# Patient Record
Sex: FEMALE | Race: WHITE | NOT HISPANIC OR LATINO | Employment: UNEMPLOYED | URBAN - NONMETROPOLITAN AREA
[De-identification: names, ages, dates, MRNs, and addresses within clinical notes are randomized per-mention and may not be internally consistent; named-entity substitution may affect disease eponyms.]

---

## 2017-06-27 ENCOUNTER — OFFICE VISIT (OUTPATIENT)
Dept: CARDIOLOGY | Facility: CLINIC | Age: 37
End: 2017-06-27

## 2017-06-27 ENCOUNTER — HOSPITAL ENCOUNTER (OUTPATIENT)
Dept: RESPIRATORY THERAPY | Facility: HOSPITAL | Age: 37
Discharge: HOME | End: 2017-06-27
Attending: INTERNAL MEDICINE | Admitting: INTERNAL MEDICINE

## 2017-06-27 VITALS
HEART RATE: 70 BPM | DIASTOLIC BLOOD PRESSURE: 96 MMHG | WEIGHT: 260.8 LBS | OXYGEN SATURATION: 96 % | BODY MASS INDEX: 46.21 KG/M2 | HEIGHT: 63 IN | SYSTOLIC BLOOD PRESSURE: 137 MMHG

## 2017-06-27 DIAGNOSIS — R00.0 TACHYCARDIA: Primary | ICD-10-CM

## 2017-06-27 DIAGNOSIS — E66.01 OBESITY, CLASS III, BMI 40-49.9 (MORBID OBESITY) (HCC): ICD-10-CM

## 2017-06-27 DIAGNOSIS — R55 NEAR SYNCOPE: ICD-10-CM

## 2017-06-27 DIAGNOSIS — R00.2 PALPITATIONS: ICD-10-CM

## 2017-06-27 DIAGNOSIS — I10 ESSENTIAL HYPERTENSION: ICD-10-CM

## 2017-06-27 PROCEDURE — 93226 XTRNL ECG REC<48 HR SCAN A/R: CPT

## 2017-06-27 PROCEDURE — 93227 XTRNL ECG REC<48 HR R&I: CPT | Performed by: INTERNAL MEDICINE

## 2017-06-27 PROCEDURE — 93000 ELECTROCARDIOGRAM COMPLETE: CPT | Performed by: INTERNAL MEDICINE

## 2017-06-27 PROCEDURE — 99244 OFF/OP CNSLTJ NEW/EST MOD 40: CPT | Performed by: INTERNAL MEDICINE

## 2017-06-27 PROCEDURE — 93225 XTRNL ECG REC<48 HRS REC: CPT

## 2017-06-27 RX ORDER — AZATHIOPRINE 50 MG/1
50 TABLET ORAL DAILY
COMMUNITY
End: 2020-09-14

## 2017-06-27 NOTE — PROGRESS NOTES
"Subjective   Starla Lane is a 37 y.o. female.     Chief Complaint   Patient presents with   • NEW PATIENT   • Rapid Heart Rate       History of Present Illness     Starla is an interesting 37-year-old woman who presents primarily for evaluation of palpitations.  She states that she has had a lifelong history of palpitations and that she has previously seen multiple different cardiologists.  He states that at one point she wore a Holter monitor and was told \"that she is having PVCs\" and essentially deal with that.  She states that she has been placed on Lopressor and that if she misses a dose or if she takes medication late she notes her symptoms are much worse.  She states that she underwent a preoperative evaluation in 2014 with a stress test and an echocardiogram both of which were normal.  This was confirmed by record review.  She states that she has frequent palpitations on a daily basis.  She notes that when her palpitations are significant she has near syncopal episodes and that on occasion her  has had to catch her.  She denies a history of congenital heart disease.  She denies history of congestive heart failure symptoms.  She denies any angina or anginal-like symptoms.  She does note that she drinks approximately 3 Mountain Dew soft drinks per day.  She does not drink any coffee.  She does not use illicit drugs.  She denies history of thyroid disease.  She denies any recent change in the pattern of her palpitations but overall feels that they have gotten worse with time.  She also notes that she has been hypertensive and believes that her blood pressures have registered as 140 mmHg over 120 mmHg.  She notes that when her blood pressure is elevated she feels flushed and has shortness of breath and chest discomfort.  She notes that she lives a very sedentary lifestyle due to her lupus and severe ankle and knee pain.    The following portions of the patient's history were reviewed and updated as " appropriate: allergies, current medications, past family history, past medical history, past social history, past surgical history and problem list.    Review of Systems   Constitutional: Positive for fatigue. Negative for activity change and appetite change.   HENT: Positive for tinnitus. Negative for congestion.    Eyes: Positive for visual disturbance.   Respiratory: Positive for shortness of breath. Negative for cough and chest tightness.    Cardiovascular: Positive for chest pain, palpitations and leg swelling.   Gastrointestinal: Negative for blood in stool, nausea and vomiting.   Endocrine: Negative for cold intolerance, heat intolerance and polyuria.   Genitourinary: Negative for dysuria.   Musculoskeletal: Positive for neck pain. Negative for myalgias.   Skin: Positive for rash.   Neurological: Positive for weakness and light-headedness. Negative for dizziness and syncope.   Hematological: Does not bruise/bleed easily.   Psychiatric/Behavioral: Negative for confusion and sleep disturbance.       Objective   Physical Exam   Constitutional: She is oriented to person, place, and time. She appears well-developed and well-nourished. No distress.   She is an obese woman in no apparent distress,   HENT:   Head: Normocephalic and atraumatic.   Nose: Nose normal.   Mouth/Throat: Oropharynx is clear and moist.   Eyes: Conjunctivae and EOM are normal. Right eye exhibits no discharge. Left eye exhibits no discharge. No scleral icterus.   Neck: Normal range of motion. No hepatojugular reflux and no JVD present. Carotid bruit is not present. No tracheal deviation present.   Cardiovascular: Normal rate, regular rhythm, S1 normal, S2 normal and intact distal pulses.  PMI is not displaced.  Exam reveals no gallop, no S3, no S4 and no friction rub.    No murmur heard.  Pulmonary/Chest: Effort normal and breath sounds normal. No accessory muscle usage. No respiratory distress. She has no wheezes. She has no rales. She  exhibits no tenderness.   Abdominal: Soft. Bowel sounds are normal. She exhibits no distension. There is no tenderness.   Musculoskeletal: Normal range of motion. She exhibits no edema or tenderness.       Vascular Status -  Her exam exhibits no right foot edema. Her exam exhibits no left foot edema.  Neurological: She is alert and oriented to person, place, and time. No cranial nerve deficit. Coordination normal.   Skin: Skin is warm and dry. She is not diaphoretic.   Nursing note and vitals reviewed.        ECG 12 Lead  Date/Time: 6/27/2017 3:42 PM  Performed by: HAL STRATTON  Authorized by: HAL STRATTON   Comments: EKG today reveals normal sinus rhythm with normal intervals and durations.  There are no acute or chronic ischemic changes noted.        Review of outside laboratory data was largely unremarkable.    Assessment/Plan   Palpitations  In regard to her history of palpitations, I suspect that she likely is having frequent premature ventricular contractions as noted above.  However, in order to confirm this I have asked her to obtain a 48 hour Holter monitor.  Additionally, I have asked her to repeat her echocardiogram in order to make sure that she indeed does not have structural heart disease but also given how frequent her premature ventricular contractions apparently are there is a possibility that she has had LV systolic dysfunction from.  I had a lengthy discussion with her noting that her overall cardiac prognosis is excellent regardless of the cause for palpitations.  Given how symptomatic she is may be reasonable to consider a mild antiarrhythmic or even perhaps ablative therapy.  We will consider our options further after the above-noted testing has been performed    HTN  Overall I am concerned that their hypertension is not well controlled.  However, her description of her blood pressures is relatively unusual with a very narrow pulse pressure.  I have asked her to bring her cuff into  the office so we can more accurately assess her blood pressure.  I will escalate her medical therapy as necessary    Obesity.  In regard to her history of obesity, I did discuss the importance of weight loss and in particular likely secondary elevation of her pulmonary measures due to restrictive lung disease as well as probable sleep apnea.  I also discussed the relationship of this with her cardiac arrhythmias and the imperative need for weight loss.  I have directed her to consider water aerobics given her arthritic complaints and have recommended weight watchers.  I've instructed her to try to obtain a BMI of 25 or less.    In short, it is been a pleasure to participate in Starla's care, I look forward to seeing her again in one month.

## 2017-07-05 ENCOUNTER — TELEPHONE (OUTPATIENT)
Dept: CARDIOLOGY | Facility: CLINIC | Age: 37
End: 2017-07-05

## 2017-07-05 NOTE — TELEPHONE ENCOUNTER
----- Message from Joni Prasad MD sent at 7/3/2017  3:48 PM EDT -----  Let her know that her Holter was normal    sdf

## 2017-07-06 ENCOUNTER — TELEPHONE (OUTPATIENT)
Dept: CARDIOLOGY | Facility: CLINIC | Age: 37
End: 2017-07-06

## 2017-07-12 ENCOUNTER — APPOINTMENT (OUTPATIENT)
Dept: CARDIOLOGY | Facility: HOSPITAL | Age: 37
End: 2017-07-12
Attending: INTERNAL MEDICINE

## 2017-07-25 ENCOUNTER — TELEPHONE (OUTPATIENT)
Dept: CARDIOLOGY | Facility: CLINIC | Age: 37
End: 2017-07-25

## 2017-07-25 NOTE — TELEPHONE ENCOUNTER
----- Message from Joni Prasad MD sent at 7/3/2017  3:48 PM EDT -----  Let her know that her Holter was normal    sdf      HAVE TRIED TO CALL THE PT OVER 5 TIMES TO MAKE HER AWARE THAT PER DR. PRASAD HER HOLTER WAS NORMAL.    WILL MAIL PT A LETTER.    [facility]

## 2020-09-14 RX ORDER — GABAPENTIN 100 MG/1
100 CAPSULE ORAL 2 TIMES DAILY
Status: ON HOLD | COMMUNITY
End: 2020-12-18

## 2020-09-14 RX ORDER — AZATHIOPRINE 50 MG/1
100 TABLET ORAL DAILY
COMMUNITY

## 2020-09-14 RX ORDER — ESCITALOPRAM OXALATE 20 MG/1
20 TABLET ORAL DAILY
Status: ON HOLD | COMMUNITY
End: 2020-12-18

## 2020-09-14 RX ORDER — HYDROCHLOROTHIAZIDE 25 MG/1
25 TABLET ORAL DAILY
COMMUNITY

## 2020-09-14 RX ORDER — VALSARTAN 160 MG/1
160 TABLET ORAL DAILY
COMMUNITY
End: 2020-10-05

## 2020-10-05 ENCOUNTER — OFFICE VISIT (OUTPATIENT)
Dept: BARIATRICS/WEIGHT MGMT | Facility: CLINIC | Age: 40
End: 2020-10-05

## 2020-10-05 VITALS
HEIGHT: 63 IN | SYSTOLIC BLOOD PRESSURE: 130 MMHG | OXYGEN SATURATION: 98 % | RESPIRATION RATE: 18 BRPM | TEMPERATURE: 96.2 F | BODY MASS INDEX: 51.91 KG/M2 | DIASTOLIC BLOOD PRESSURE: 72 MMHG | HEART RATE: 82 BPM | WEIGHT: 293 LBS

## 2020-10-05 DIAGNOSIS — I10 ESSENTIAL HYPERTENSION: ICD-10-CM

## 2020-10-05 DIAGNOSIS — M19.90 OSTEOARTHRITIS, UNSPECIFIED OSTEOARTHRITIS TYPE, UNSPECIFIED SITE: ICD-10-CM

## 2020-10-05 DIAGNOSIS — M32.9 LUPUS (HCC): ICD-10-CM

## 2020-10-05 DIAGNOSIS — R13.10 DYSPHAGIA, UNSPECIFIED TYPE: Primary | ICD-10-CM

## 2020-10-05 DIAGNOSIS — E78.5 HYPERLIPIDEMIA, UNSPECIFIED HYPERLIPIDEMIA TYPE: ICD-10-CM

## 2020-10-05 DIAGNOSIS — R11.11 VOMITING WITHOUT NAUSEA, INTRACTABILITY OF VOMITING NOT SPECIFIED, UNSPECIFIED VOMITING TYPE: ICD-10-CM

## 2020-10-05 DIAGNOSIS — K21.9 GASTROESOPHAGEAL REFLUX DISEASE, UNSPECIFIED WHETHER ESOPHAGITIS PRESENT: ICD-10-CM

## 2020-10-05 DIAGNOSIS — K95.09 COMPLICATION OF GASTRIC BAND PROCEDURE: ICD-10-CM

## 2020-10-05 DIAGNOSIS — M54.9 CHRONIC BACK PAIN, UNSPECIFIED BACK LOCATION, UNSPECIFIED BACK PAIN LATERALITY: ICD-10-CM

## 2020-10-05 DIAGNOSIS — G89.29 CHRONIC BACK PAIN, UNSPECIFIED BACK LOCATION, UNSPECIFIED BACK PAIN LATERALITY: ICD-10-CM

## 2020-10-05 PROCEDURE — 99204 OFFICE O/P NEW MOD 45 MIN: CPT | Performed by: SURGERY

## 2020-10-05 NOTE — PROGRESS NOTES
"20 ADDENDUM:    10/23/20 UGI  IMPRESSION:  1. Status post gastric lap band x6 years. The LAP-BAND appears to be in  the appropriate position and orientation. The LAP-BAND channel is  patent.  2. Mild gastroesophageal reflux to the level of the cervical esophagus    Images reviewed: band is in appropriate orientation, band channel is grossly patent. Widened distal esophagus.    Proceed with EGD.      [facility] BARIATRIC SURGERY  [address]  [address]  [facility] [id number] [phone]      Patient  Name:  Starla Lane  :  1980      Date of Visit: 10/5/2020      Chief Complaint:  Transfer of care  History of Present Illness:  Starla Lane is a 40 y.o. female who presents today for transfer of care.    S/p adjustable gastric band by Dr. Colleen Queen in [place] on 2014.  She wants to have the band removed because of reflux/vomiting.      +Vomiting.  Band is reportedly emptied, and \"I can't keep anything down.\"  Last seen at Dr. Queen' office in 2016.  Dr. Queen' wife would see the patient in the office for fills.  She had been advised to have the band removed in 2016, but decided not to b/c she thought she would have weight regain.    Dysphagia? Frequent, to solids and soft foods.  Epigastric pain? denies  Port pain? denies  GERD? Yes.  Does not take meds.    Vomiting?  Frequently, almost daily since band was placed.  There are some days she cannot even get fluids down.    10/2014 EGD results reviewed: \"basal cell hyperplasia\" in esophagus.    Weight before band 267.  Lowest weight after band was 238.    She is interested in removing the band and going to a sleeve gastrectomy.        All past medical, surgical, social and family history have been obtained and discussed as pertinent to bariatric surgery as below.     Past Medical History:   Diagnosis Date   • Anxiety    • Arrhythmia    • BMI 50.0-59.9, adult (CMS/[facility])    • Chronic back pain    • Degenerative disc " disease, lumbar    • Depression    • Dyspnea on exertion    • GERD (gastroesophageal reflux disease)    • Hypercholesterolemia    • Hypertension     3 meds   • Lupus (CMS/HCC)     on azathioprine and plaquenil.  Extreme fatigue, arthalgias, rash   • Malignant melanoma of skin (CMS/HCC)     left shoulder, left abdomen.s/p wide local excision.  She sees a dermatologist in [place] for biyearly exams.   • Osteoarthritis      Past Surgical History:   Procedure Laterality Date   • COLONOSCOPY  2019   • ENDOSCOPY  2016   • EXPLORATORY LAPAROTOMY  1994    8 pound cyst removal on ovary at age 16.     • LAPAROSCOPIC CHOLECYSTECTOMY  2016   • LAPAROSCOPIC GASTRIC BANDING  12/17/2014    DR. CHUCK PEÑA   • TONSILLECTOMY  1985       Allergies   Allergen Reactions   • Rocephin [Ceftriaxone] Swelling and Rash     SWELLING OF LIPS       Current Outpatient Medications:   •  azaTHIOprine (IMURAN) 50 MG tablet, Take 50 mg by mouth 2 (two) times a day., Disp: , Rfl:   •  escitalopram (LEXAPRO) 20 MG tablet, Take 20 mg by mouth Daily., Disp: , Rfl:   •  gabapentin (NEURONTIN) 100 MG capsule, Take 100 mg by mouth 2 (two) times a day., Disp: , Rfl:   •  hydroCHLOROthiazide (HYDRODIURIL) 25 MG tablet, Take 25 mg by mouth Daily., Disp: , Rfl:   •  HYDROcodone-acetaminophen (NORCO) 7.5-325 MG per tablet, Take 1 tablet by mouth daily as needed for moderate pain (4-6)., Disp: 30 tablet, Rfl: 0  •  hydroxychloroquine (PLAQUENIL) 200 MG tablet, Take 200 mg by mouth 2 (Two) Times a Day., Disp: , Rfl:   •  metoprolol tartrate (LOPRESSOR) 100 MG tablet, Take 100 mg by mouth Daily., Disp: , Rfl:     Social History     Socioeconomic History   • Marital status: Single     Spouse name: Not on file   • Number of children: Not on file   • Years of education: Not on file   • Highest education level: Not on file   Tobacco Use   • Smoking status: Never Smoker   • Smokeless tobacco: Never Used   Substance and Sexual Activity   • Alcohol use: No   • Drug  use: No   Social History Narrative    Pt lives in [place], [place] w/  Krzysztof. They have 1 child 13yo. 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      Family History   Problem Relation Age of Onset   • Hypertension Mother    • Skin cancer Mother    • Pulmonary embolism Mother    • Heart attack Father    • Diabetes Father    • Hypertension Father    • No Known Problems Maternal Grandmother    • Lung cancer Maternal Grandfather    • Breast cancer Paternal Grandmother    • Heart attack Paternal Grandfather        Review of Systems   Constitutional: Positive for fatigue and unexpected weight gain. Negative for chills, diaphoresis, fever and unexpected weight loss.   HENT: Negative for congestion and facial swelling.    Eyes: Negative for blurred vision, double vision and discharge.   Respiratory: Negative for chest tightness, shortness of breath and stridor.    Cardiovascular: Negative for chest pain, palpitations and leg swelling.   Gastrointestinal: Positive for nausea, vomiting and GERD. Negative for abdominal pain and blood in stool.   Endocrine: Negative for polydipsia.   Genitourinary: Negative for hematuria.   Musculoskeletal: Positive for arthralgias.   Skin: Negative for color change.   Allergic/Immunologic: Negative for immunocompromised state.   Neurological: Negative for confusion.   Psychiatric/Behavioral: Negative for self-injury.        Physical Exam:  Vital Signs:  Weight: 135 kg (297 lb)   Body mass index is 52.61 kg/m².  Temp: 96.2 °F (35.7 °C)   Heart Rate: 82   BP: 130/72     Physical Exam  Constitutional:       General: She is not in acute distress.     Appearance: She is well-developed. She is not diaphoretic.   HENT:      Head: Normocephalic and atraumatic.      Mouth/Throat:      Pharynx: No oropharyngeal exudate.   Eyes:      Conjunctiva/sclera: Conjunctivae normal.      Pupils: Pupils are equal, round, and reactive to light.   Cardiovascular:      Rate and  Rhythm: Normal rate and regular rhythm.   Pulmonary:      Effort: Pulmonary effort is normal. No respiratory distress.   Abdominal:      General: There is no distension.      Palpations: Abdomen is soft.      Comments: Lap scars.  Lower midline scar.  Band port palpable in RUQ a ways from the linear scar, no erythema/fluctuance/tenderness.  Wide local excision scar in LUQ from melanoma   Skin:     General: Skin is warm and dry.      Coloration: Skin is not pale.   Neurological:      Mental Status: She is alert and oriented to person, place, and time.      Cranial Nerves: No cranial nerve deficit.   Psychiatric:         Behavior: Behavior normal.         Thought Content: Thought content normal.         Patient Active Problem List   Diagnosis   • Low back pain with sciatica   • Arthralgia of multiple joints   • Obesity, Class III, BMI 40-49.9 (morbid obesity) (CMS/HCC)   • Palpitations   • Near syncope   • Essential hypertension       Assessment:    Starla Lane is a 40 y.o. year old female transferring care of adjustable gastric band.  Symptomatic of dysphagia, vomiting, GERD.      Plan:      UGI, likely EGD after.  She is interested ultimately in band removal and revision.          Luna Hassan MD

## 2020-10-23 ENCOUNTER — HOSPITAL ENCOUNTER (OUTPATIENT)
Dept: GENERAL RADIOLOGY | Facility: HOSPITAL | Age: 40
Discharge: HOME | End: 2020-10-23
Admitting: SURGERY

## 2020-10-23 DIAGNOSIS — R13.10 DYSPHAGIA, UNSPECIFIED TYPE: ICD-10-CM

## 2020-10-23 PROCEDURE — 74240 X-RAY XM UPR GI TRC 1CNTRST: CPT

## 2020-10-23 RX ADMIN — BARIUM SULFATE 183 ML: 960 POWDER, FOR SUSPENSION ORAL at 13:22

## 2020-11-02 RX ORDER — SODIUM CHLORIDE 9 MG/ML
150 INJECTION, SOLUTION INTRAVENOUS CONTINUOUS
Status: CANCELLED | OUTPATIENT
Start: 2020-11-02

## 2020-12-17 ENCOUNTER — HOSPITAL ENCOUNTER (INPATIENT)
Facility: HOSPITAL | Age: 40
LOS: 2 days | Discharge: HOME | End: 2020-12-20
Attending: STUDENT IN AN ORGANIZED HEALTH CARE EDUCATION/TRAINING PROGRAM | Admitting: INTERNAL MEDICINE

## 2020-12-17 ENCOUNTER — APPOINTMENT (OUTPATIENT)
Dept: CT IMAGING | Facility: HOSPITAL | Age: 40
End: 2020-12-17

## 2020-12-17 DIAGNOSIS — I26.99 PULMONARY EMBOLISM, UNSPECIFIED CHRONICITY, UNSPECIFIED PULMONARY EMBOLISM TYPE, UNSPECIFIED WHETHER ACUTE COR PULMONALE PRESENT (HCC): Primary | ICD-10-CM

## 2020-12-17 LAB
ALBUMIN SERPL-MCNC: 3.72 G/DL (ref 3.5–5.2)
ALBUMIN/GLOB SERPL: 1.4 G/DL
ALP SERPL-CCNC: 52 U/L (ref 39–117)
ALT SERPL W P-5'-P-CCNC: 9 U/L (ref 1–33)
ANION GAP SERPL CALCULATED.3IONS-SCNC: 9.1 MMOL/L (ref 5–15)
APTT PPP: 27.3 SECONDS (ref 25.6–35.3)
AST SERPL-CCNC: 17 U/L (ref 1–32)
BASOPHILS # BLD AUTO: 0.1 10*3/MM3 (ref 0–0.2)
BASOPHILS NFR BLD AUTO: 0.9 % (ref 0–1.5)
BILIRUB SERPL-MCNC: 0.2 MG/DL (ref 0–1.2)
BUN SERPL-MCNC: 13 MG/DL (ref 6–20)
BUN/CREAT SERPL: 11 (ref 7–25)
CALCIUM SPEC-SCNC: 9 MG/DL (ref 8.6–10.5)
CHLORIDE SERPL-SCNC: 103 MMOL/L (ref 98–107)
CO2 SERPL-SCNC: 25.9 MMOL/L (ref 22–29)
CREAT SERPL-MCNC: 1.18 MG/DL (ref 0.57–1)
CRP SERPL-MCNC: 3.29 MG/DL (ref 0–0.5)
D DIMER PPP FEU-MCNC: 2.43 MCGFEU/ML (ref 0–0.5)
DEPRECATED RDW RBC AUTO: 44.5 FL (ref 37–54)
EOSINOPHIL # BLD AUTO: 0.25 10*3/MM3 (ref 0–0.4)
EOSINOPHIL NFR BLD AUTO: 2.3 % (ref 0.3–6.2)
ERYTHROCYTE [DISTWIDTH] IN BLOOD BY AUTOMATED COUNT: 13.1 % (ref 12.3–15.4)
GFR SERPL CREATININE-BSD FRML MDRD: 51 ML/MIN/1.73
GLOBULIN UR ELPH-MCNC: 2.6 GM/DL
GLUCOSE SERPL-MCNC: 96 MG/DL (ref 65–99)
HCG SERPL QL: NEGATIVE
HCT VFR BLD AUTO: 38.5 % (ref 34–46.6)
HGB BLD-MCNC: 11.9 G/DL (ref 12–15.9)
HOLD SPECIMEN: NORMAL
HOLD SPECIMEN: NORMAL
IMM GRANULOCYTES # BLD AUTO: 0.03 10*3/MM3 (ref 0–0.05)
IMM GRANULOCYTES NFR BLD AUTO: 0.3 % (ref 0–0.5)
INR PPP: 1.04 (ref 0.9–1.1)
LYMPHOCYTES # BLD AUTO: 2.15 10*3/MM3 (ref 0.7–3.1)
LYMPHOCYTES NFR BLD AUTO: 19.4 % (ref 19.6–45.3)
MCH RBC QN AUTO: 28.5 PG (ref 26.6–33)
MCHC RBC AUTO-ENTMCNC: 30.9 G/DL (ref 31.5–35.7)
MCV RBC AUTO: 92.1 FL (ref 79–97)
MONOCYTES # BLD AUTO: 0.75 10*3/MM3 (ref 0.1–0.9)
MONOCYTES NFR BLD AUTO: 6.8 % (ref 5–12)
NEUTROPHILS NFR BLD AUTO: 7.78 10*3/MM3 (ref 1.7–7)
NEUTROPHILS NFR BLD AUTO: 70.3 % (ref 42.7–76)
NRBC BLD AUTO-RTO: 0 /100 WBC (ref 0–0.2)
PLATELET # BLD AUTO: 279 10*3/MM3 (ref 140–450)
PMV BLD AUTO: 9.7 FL (ref 6–12)
POTASSIUM SERPL-SCNC: 4.2 MMOL/L (ref 3.5–5.2)
PROT SERPL-MCNC: 6.3 G/DL (ref 6–8.5)
PROTHROMBIN TIME: 13.4 SECONDS (ref 11.9–14.1)
RBC # BLD AUTO: 4.18 10*6/MM3 (ref 3.77–5.28)
SODIUM SERPL-SCNC: 138 MMOL/L (ref 136–145)
TROPONIN T SERPL-MCNC: <0.01 NG/ML (ref 0–0.03)
WBC # BLD AUTO: 11.06 10*3/MM3 (ref 3.4–10.8)
WHOLE BLOOD HOLD SPECIMEN: NORMAL
WHOLE BLOOD HOLD SPECIMEN: NORMAL

## 2020-12-17 PROCEDURE — 80053 COMPREHEN METABOLIC PANEL: CPT | Performed by: PHYSICIAN ASSISTANT

## 2020-12-17 PROCEDURE — 85730 THROMBOPLASTIN TIME PARTIAL: CPT | Performed by: PHYSICIAN ASSISTANT

## 2020-12-17 PROCEDURE — 99284 EMERGENCY DEPT VISIT MOD MDM: CPT

## 2020-12-17 PROCEDURE — 86140 C-REACTIVE PROTEIN: CPT | Performed by: PHYSICIAN ASSISTANT

## 2020-12-17 PROCEDURE — 71275 CT ANGIOGRAPHY CHEST: CPT

## 2020-12-17 PROCEDURE — 84484 ASSAY OF TROPONIN QUANT: CPT | Performed by: PHYSICIAN ASSISTANT

## 2020-12-17 PROCEDURE — 84703 CHORIONIC GONADOTROPIN ASSAY: CPT | Performed by: PHYSICIAN ASSISTANT

## 2020-12-17 PROCEDURE — 85025 COMPLETE CBC W/AUTO DIFF WBC: CPT | Performed by: PHYSICIAN ASSISTANT

## 2020-12-17 PROCEDURE — 85610 PROTHROMBIN TIME: CPT | Performed by: PHYSICIAN ASSISTANT

## 2020-12-17 PROCEDURE — 85379 FIBRIN DEGRADATION QUANT: CPT | Performed by: PHYSICIAN ASSISTANT

## 2020-12-17 PROCEDURE — 0 IOPAMIDOL PER 1 ML: Performed by: PHYSICIAN ASSISTANT

## 2020-12-17 PROCEDURE — 25010000002 HYALURONIDASE (HUMAN) 150 UNIT/ML SOLUTION: Performed by: STUDENT IN AN ORGANIZED HEALTH CARE EDUCATION/TRAINING PROGRAM

## 2020-12-17 RX ORDER — HEPARIN SODIUM 5000 [USP'U]/ML
5000 INJECTION, SOLUTION INTRAVENOUS; SUBCUTANEOUS ONCE
Status: COMPLETED | OUTPATIENT
Start: 2020-12-17 | End: 2020-12-18

## 2020-12-17 RX ORDER — SODIUM CHLORIDE 0.9 % (FLUSH) 0.9 %
10 SYRINGE (ML) INJECTION AS NEEDED
Status: DISCONTINUED | OUTPATIENT
Start: 2020-12-17 | End: 2020-12-20 | Stop reason: HOSPADM

## 2020-12-17 RX ORDER — HEPARIN SODIUM 10000 [USP'U]/100ML
7.41 INJECTION, SOLUTION INTRAVENOUS
Status: DISCONTINUED | OUTPATIENT
Start: 2020-12-17 | End: 2020-12-19

## 2020-12-17 RX ORDER — HEPARIN SODIUM 5000 [USP'U]/ML
5000 INJECTION, SOLUTION INTRAVENOUS; SUBCUTANEOUS AS NEEDED
Status: DISCONTINUED | OUTPATIENT
Start: 2020-12-17 | End: 2020-12-19

## 2020-12-17 RX ORDER — HEPARIN SODIUM 5000 [USP'U]/ML
2500 INJECTION, SOLUTION INTRAVENOUS; SUBCUTANEOUS AS NEEDED
Status: DISCONTINUED | OUTPATIENT
Start: 2020-12-17 | End: 2020-12-19

## 2020-12-17 RX ORDER — METHYLPREDNISOLONE SODIUM SUCCINATE 125 MG/2ML
125 INJECTION, POWDER, LYOPHILIZED, FOR SOLUTION INTRAMUSCULAR; INTRAVENOUS ONCE
Status: DISCONTINUED | OUTPATIENT
Start: 2020-12-17 | End: 2020-12-17

## 2020-12-17 RX ADMIN — SODIUM CHLORIDE 500 ML: 9 INJECTION, SOLUTION INTRAVENOUS at 18:50

## 2020-12-17 RX ADMIN — IOPAMIDOL 100 ML: 755 INJECTION, SOLUTION INTRAVENOUS at 23:02

## 2020-12-17 RX ADMIN — HYALURONIDASE (HUMAN RECOMBINANT) 60 UNITS: 150 INJECTION, SOLUTION SUBCUTANEOUS at 21:00

## 2020-12-17 NOTE — ED PROVIDER NOTES
Subjective   40-year-old female presents to the emergency room with shortness of breath x6 months.  Patient states shortness of breath has seemingly gotten worse over the past few days.  Patient was seen by her PCP in [place] and had blood drawn and she was called today and told to come to the emergency room because she had an elevated D-dimer.  Patient denies chest pain or recent surgeries.  She does not take any type of hormone therapy.  States her mother  of a pulmonary embolus suddenly, therefore that is why she is concerned.  She denies any specific aggravating or alleviating factors.  She denies any other complaints or concerns at this time.      History provided by:  Patient   used: No        Review of Systems   Constitutional: Negative.  Negative for fever.   HENT: Negative.    Respiratory: Positive for shortness of breath.    Cardiovascular: Negative.  Negative for chest pain.   Gastrointestinal: Negative.  Negative for abdominal pain.   Endocrine: Negative.    Genitourinary: Negative.  Negative for dysuria.   Skin: Negative.    Neurological: Negative.    Psychiatric/Behavioral: Negative.    All other systems reviewed and are negative.      Past Medical History:   Diagnosis Date   • Anxiety    • BMI 50.0-59.9, adult (CMS/HCC)    • Chronic back pain    • Degenerative disc disease, lumbar    • Depression    • GERD (gastroesophageal reflux disease)    • Hypercholesterolemia    • Hypertension     3 meds   • Lupus (CMS/HCC)     on azathioprine and plaquenil.  Extreme fatigue, arthalgias, rash   • Malignant melanoma of skin (CMS/HCC)     left shoulder, left abdomen.s/p wide local excision.  She sees a dermatologist in [place] for biyearly exams.   • Osteoarthritis        Allergies   Allergen Reactions   • Rocephin [Ceftriaxone] Swelling and Rash     SWELLING OF LIPS       Past Surgical History:   Procedure Laterality Date   • COLONOSCOPY  2019   • ENDOSCOPY  2016   • EXPLORATORY  LAPAROTOMY  1994    8 pound cyst removal on ovary at age 16.     • LAPAROSCOPIC CHOLECYSTECTOMY  2016   • LAPAROSCOPIC GASTRIC BANDING  12/17/2014    DR. CHUCK PEÑA   • TONSILLECTOMY  1985       Family History   Problem Relation Age of Onset   • Hypertension Mother    • Skin cancer Mother    • Pulmonary embolism Mother    • Heart attack Father    • Diabetes Father    • Hypertension Father    • No Known Problems Maternal Grandmother    • Lung cancer Maternal Grandfather    • Breast cancer Paternal Grandmother    • Heart attack Paternal Grandfather        Social History     Socioeconomic History   • Marital status:      Spouse name: Not on file   • Number of children: Not on file   • Years of education: Not on file   • Highest education level: Not on file   Tobacco Use   • Smoking status: Never Smoker   • Smokeless tobacco: Never Used   Substance and Sexual Activity   • Alcohol use: No   • Drug use: No   Social History Narrative    Pt lives in [place], [place] w/  Krzysztof. They have 1 child 13yo. 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            Objective   Physical Exam  Vitals signs and nursing note reviewed.   Constitutional:       General: She is not in acute distress.     Appearance: She is well-developed. She is not diaphoretic.   HENT:      Head: Normocephalic and atraumatic.      Right Ear: External ear normal.      Left Ear: External ear normal.      Nose: Nose normal.   Eyes:      Conjunctiva/sclera: Conjunctivae normal.      Pupils: Pupils are equal, round, and reactive to light.   Neck:      Musculoskeletal: Normal range of motion and neck supple.      Vascular: No JVD.      Trachea: No tracheal deviation.   Cardiovascular:      Rate and Rhythm: Normal rate and regular rhythm.      Heart sounds: Normal heart sounds. No murmur.   Pulmonary:      Effort: Pulmonary effort is normal. No respiratory distress.      Breath sounds: Normal breath sounds. No wheezing.    Abdominal:      General: Bowel sounds are normal.      Palpations: Abdomen is soft.      Tenderness: There is no abdominal tenderness.   Musculoskeletal: Normal range of motion.         General: No deformity.   Skin:     General: Skin is warm and dry.      Coloration: Skin is not pale.      Findings: No erythema or rash.   Neurological:      Mental Status: She is alert and oriented to person, place, and time.      Cranial Nerves: No cranial nerve deficit.   Psychiatric:         Behavior: Behavior normal.         Thought Content: Thought content normal.         Procedures           ED Course  ED Course as of Dec 18 1902   Thu Dec 17, 2020   2301 Pt reportedly told RNLeonie that she takes OCP's.    [TK]   2335 Pt endorsed to Dr. Méndez.    [TK]   2336 Signout from [facility] AT THIS TIME. AWAITING ESULTS OF ct pe    [JM]   2338 BILATERAL pe, NO ACUTE RIGHT HEART STRAIN. EXTENSIVE EMBOLISM    [JM]   Fri Dec 18, 2020   0048 Urine pregnancy pending.      [JM]      ED Course User Index  [JM] Marvin Méndez DO  [TK] Donnie Tay PA-C                                           MDM  Number of Diagnoses or Management Options  Pulmonary embolism, unspecified chronicity, unspecified pulmonary embolism type, unspecified whether acute cor pulmonale present (CMS/HCC): new and requires workup     Amount and/or Complexity of Data Reviewed  Clinical lab tests: reviewed and ordered  Tests in the radiology section of CPT®: reviewed and ordered  Discuss the patient with other providers: yes (Honey)    Risk of Complications, Morbidity, and/or Mortality  Presenting problems: moderate  Diagnostic procedures: moderate  Management options: moderate    Patient Progress  Patient progress: stable      Final diagnoses:   Pulmonary embolism, unspecified chronicity, unspecified pulmonary embolism type, unspecified whether acute cor pulmonale present (CMS/HCC)            Donnie Tay PA-C  12/18/20 1126       Jasvir  Donnie WOODSON PA-C  12/18/20 1902       Donnie Tay PA-C  12/18/20 1902

## 2020-12-17 NOTE — ED NOTES
Informed consent for IV contrast obtained and placed on chart at this time.       Collette, Ashley N, RN  12/17/20 5063

## 2020-12-18 ENCOUNTER — APPOINTMENT (OUTPATIENT)
Dept: CARDIOLOGY | Facility: HOSPITAL | Age: 40
End: 2020-12-18

## 2020-12-18 ENCOUNTER — APPOINTMENT (OUTPATIENT)
Dept: ULTRASOUND IMAGING | Facility: HOSPITAL | Age: 40
End: 2020-12-18

## 2020-12-18 PROBLEM — I10 ESSENTIAL HYPERTENSION: Chronic | Status: ACTIVE | Noted: 2017-06-27

## 2020-12-18 PROBLEM — E66.01 OBESITY, CLASS III, BMI 40-49.9 (MORBID OBESITY): Status: RESOLVED | Noted: 2017-06-27 | Resolved: 2020-12-18

## 2020-12-18 PROBLEM — K21.9 GERD (GASTROESOPHAGEAL REFLUX DISEASE): Chronic | Status: ACTIVE | Noted: 2020-12-18

## 2020-12-18 PROBLEM — F32.A DEPRESSION: Chronic | Status: ACTIVE | Noted: 2020-12-18

## 2020-12-18 PROBLEM — M32.9 LUPUS (SYSTEMIC LUPUS ERYTHEMATOSUS) (HCC): Chronic | Status: ACTIVE | Noted: 2020-12-18

## 2020-12-18 PROBLEM — E78.00 HYPERCHOLESTEROLEMIA: Chronic | Status: ACTIVE | Noted: 2020-12-18

## 2020-12-18 PROBLEM — F41.9 ANXIETY DISORDER: Chronic | Status: ACTIVE | Noted: 2020-12-18

## 2020-12-18 PROBLEM — R00.2 PALPITATIONS: Status: RESOLVED | Noted: 2017-06-27 | Resolved: 2020-12-18

## 2020-12-18 PROBLEM — E66.01 MORBID OBESITY (HCC): Chronic | Status: ACTIVE | Noted: 2017-06-27

## 2020-12-18 PROBLEM — M19.90 OSTEOARTHRITIS: Chronic | Status: ACTIVE | Noted: 2020-12-18

## 2020-12-18 PROBLEM — R55 NEAR SYNCOPE: Status: RESOLVED | Noted: 2017-06-27 | Resolved: 2020-12-18

## 2020-12-18 PROBLEM — I26.99 PULMONARY EMBOLI (HCC): Status: ACTIVE | Noted: 2020-12-18

## 2020-12-18 LAB
ALBUMIN SERPL-MCNC: 3.85 G/DL (ref 3.5–5.2)
ALBUMIN/GLOB SERPL: 1.3 G/DL
ALP SERPL-CCNC: 52 U/L (ref 39–117)
ALT SERPL W P-5'-P-CCNC: 9 U/L (ref 1–33)
ANION GAP SERPL CALCULATED.3IONS-SCNC: 11.2 MMOL/L (ref 5–15)
APTT PPP: 37.7 SECONDS (ref 25.6–35.3)
APTT PPP: 41.1 SECONDS (ref 25.6–35.3)
APTT PPP: 46.2 SECONDS (ref 25.6–35.3)
APTT PPP: 51 SECONDS (ref 25.6–35.3)
AST SERPL-CCNC: 15 U/L (ref 1–32)
BASOPHILS # BLD AUTO: 0.09 10*3/MM3 (ref 0–0.2)
BASOPHILS NFR BLD AUTO: 0.8 % (ref 0–1.5)
BILIRUB SERPL-MCNC: 0.3 MG/DL (ref 0–1.2)
BUN SERPL-MCNC: 12 MG/DL (ref 6–20)
BUN/CREAT SERPL: 11.3 (ref 7–25)
CALCIUM SPEC-SCNC: 8.7 MG/DL (ref 8.6–10.5)
CHLORIDE SERPL-SCNC: 104 MMOL/L (ref 98–107)
CO2 SERPL-SCNC: 22.8 MMOL/L (ref 22–29)
CREAT SERPL-MCNC: 1.06 MG/DL (ref 0.57–1)
DEPRECATED RDW RBC AUTO: 45.5 FL (ref 37–54)
EOSINOPHIL # BLD AUTO: 0.24 10*3/MM3 (ref 0–0.4)
EOSINOPHIL NFR BLD AUTO: 2.1 % (ref 0.3–6.2)
ERYTHROCYTE [DISTWIDTH] IN BLOOD BY AUTOMATED COUNT: 13.2 % (ref 12.3–15.4)
FERRITIN SERPL-MCNC: 27.45 NG/ML (ref 13–150)
FLUAV RNA RESP QL NAA+PROBE: NOT DETECTED
FLUBV RNA RESP QL NAA+PROBE: NOT DETECTED
FOLATE SERPL-MCNC: 8.93 NG/ML (ref 4.78–24.2)
GFR SERPL CREATININE-BSD FRML MDRD: 58 ML/MIN/1.73
GLOBULIN UR ELPH-MCNC: 3 GM/DL
GLUCOSE SERPL-MCNC: 78 MG/DL (ref 65–99)
HBA1C MFR BLD: 5.2 % (ref 4.8–5.6)
HCT VFR BLD AUTO: 40.2 % (ref 34–46.6)
HGB BLD-MCNC: 12.1 G/DL (ref 12–15.9)
IMM GRANULOCYTES # BLD AUTO: 0.06 10*3/MM3 (ref 0–0.05)
IMM GRANULOCYTES NFR BLD AUTO: 0.5 % (ref 0–0.5)
INR PPP: 1.07 (ref 0.9–1.1)
IRON 24H UR-MRATE: 106 MCG/DL (ref 37–145)
IRON SATN MFR SERPL: 21 % (ref 20–50)
LYMPHOCYTES # BLD AUTO: 3.33 10*3/MM3 (ref 0.7–3.1)
LYMPHOCYTES NFR BLD AUTO: 28.8 % (ref 19.6–45.3)
MAGNESIUM SERPL-MCNC: 1.8 MG/DL (ref 1.6–2.6)
MCH RBC QN AUTO: 28.3 PG (ref 26.6–33)
MCHC RBC AUTO-ENTMCNC: 30.1 G/DL (ref 31.5–35.7)
MCV RBC AUTO: 93.9 FL (ref 79–97)
MONOCYTES # BLD AUTO: 0.78 10*3/MM3 (ref 0.1–0.9)
MONOCYTES NFR BLD AUTO: 6.7 % (ref 5–12)
NEUTROPHILS NFR BLD AUTO: 61.1 % (ref 42.7–76)
NEUTROPHILS NFR BLD AUTO: 7.06 10*3/MM3 (ref 1.7–7)
NRBC BLD AUTO-RTO: 0 /100 WBC (ref 0–0.2)
PHOSPHATE SERPL-MCNC: 3.1 MG/DL (ref 2.5–4.5)
PLATELET # BLD AUTO: 267 10*3/MM3 (ref 140–450)
PMV BLD AUTO: 10.4 FL (ref 6–12)
POTASSIUM SERPL-SCNC: 3.9 MMOL/L (ref 3.5–5.2)
PROT SERPL-MCNC: 6.8 G/DL (ref 6–8.5)
PROTHROMBIN TIME: 13.7 SECONDS (ref 11.9–14.1)
QT INTERVAL: 402 MS
QTC INTERVAL: 454 MS
RBC # BLD AUTO: 4.28 10*6/MM3 (ref 3.77–5.28)
SARS-COV-2 RNA RESP QL NAA+PROBE: NOT DETECTED
SODIUM SERPL-SCNC: 138 MMOL/L (ref 136–145)
TIBC SERPL-MCNC: 502 MCG/DL (ref 298–536)
TRANSFERRIN SERPL-MCNC: 337 MG/DL (ref 200–360)
TROPONIN T SERPL-MCNC: <0.01 NG/ML (ref 0–0.03)
TROPONIN T SERPL-MCNC: <0.01 NG/ML (ref 0–0.03)
TSH SERPL DL<=0.05 MIU/L-ACNC: 2.9 UIU/ML (ref 0.27–4.2)
VIT B12 BLD-MCNC: 207 PG/ML (ref 211–946)
WBC # BLD AUTO: 11.56 10*3/MM3 (ref 3.4–10.8)

## 2020-12-18 PROCEDURE — 85613 RUSSELL VIPER VENOM DILUTED: CPT | Performed by: PHYSICIAN ASSISTANT

## 2020-12-18 PROCEDURE — 85598 HEXAGNAL PHOSPH PLTLT NEUTRL: CPT | Performed by: PHYSICIAN ASSISTANT

## 2020-12-18 PROCEDURE — 93010 ELECTROCARDIOGRAM REPORT: CPT | Performed by: INTERNAL MEDICINE

## 2020-12-18 PROCEDURE — 25010000002 HEPARIN (PORCINE) PER 1000 UNITS: Performed by: STUDENT IN AN ORGANIZED HEALTH CARE EDUCATION/TRAINING PROGRAM

## 2020-12-18 PROCEDURE — 85732 THROMBOPLASTIN TIME PARTIAL: CPT | Performed by: PHYSICIAN ASSISTANT

## 2020-12-18 PROCEDURE — 80053 COMPREHEN METABOLIC PANEL: CPT | Performed by: INTERNAL MEDICINE

## 2020-12-18 PROCEDURE — 83540 ASSAY OF IRON: CPT | Performed by: PHYSICIAN ASSISTANT

## 2020-12-18 PROCEDURE — 85670 THROMBIN TIME PLASMA: CPT | Performed by: PHYSICIAN ASSISTANT

## 2020-12-18 PROCEDURE — 82607 VITAMIN B-12: CPT | Performed by: PHYSICIAN ASSISTANT

## 2020-12-18 PROCEDURE — 84443 ASSAY THYROID STIM HORMONE: CPT | Performed by: PHYSICIAN ASSISTANT

## 2020-12-18 PROCEDURE — 83735 ASSAY OF MAGNESIUM: CPT | Performed by: PHYSICIAN ASSISTANT

## 2020-12-18 PROCEDURE — 93970 EXTREMITY STUDY: CPT | Performed by: RADIOLOGY

## 2020-12-18 PROCEDURE — 87636 SARSCOV2 & INF A&B AMP PRB: CPT | Performed by: STUDENT IN AN ORGANIZED HEALTH CARE EDUCATION/TRAINING PROGRAM

## 2020-12-18 PROCEDURE — 63710000001 AZATHIOPRINE PER 50 MG: Performed by: INTERNAL MEDICINE

## 2020-12-18 PROCEDURE — 25010000002 CYANOCOBALAMIN PER 1000 MCG: Performed by: PHYSICIAN ASSISTANT

## 2020-12-18 PROCEDURE — 93005 ELECTROCARDIOGRAM TRACING: CPT | Performed by: PHYSICIAN ASSISTANT

## 2020-12-18 PROCEDURE — 85730 THROMBOPLASTIN TIME PARTIAL: CPT | Performed by: PHYSICIAN ASSISTANT

## 2020-12-18 PROCEDURE — 82728 ASSAY OF FERRITIN: CPT | Performed by: PHYSICIAN ASSISTANT

## 2020-12-18 PROCEDURE — 83036 HEMOGLOBIN GLYCOSYLATED A1C: CPT | Performed by: PHYSICIAN ASSISTANT

## 2020-12-18 PROCEDURE — 93970 EXTREMITY STUDY: CPT

## 2020-12-18 PROCEDURE — 93306 TTE W/DOPPLER COMPLETE: CPT

## 2020-12-18 PROCEDURE — 25010000002 HEPARIN (PORCINE) PER 1000 UNITS: Performed by: INTERNAL MEDICINE

## 2020-12-18 PROCEDURE — 85610 PROTHROMBIN TIME: CPT | Performed by: INTERNAL MEDICINE

## 2020-12-18 PROCEDURE — 85025 COMPLETE CBC W/AUTO DIFF WBC: CPT | Performed by: STUDENT IN AN ORGANIZED HEALTH CARE EDUCATION/TRAINING PROGRAM

## 2020-12-18 PROCEDURE — 84100 ASSAY OF PHOSPHORUS: CPT | Performed by: PHYSICIAN ASSISTANT

## 2020-12-18 PROCEDURE — 93306 TTE W/DOPPLER COMPLETE: CPT | Performed by: SPECIALIST

## 2020-12-18 PROCEDURE — 82746 ASSAY OF FOLIC ACID SERUM: CPT | Performed by: PHYSICIAN ASSISTANT

## 2020-12-18 PROCEDURE — 85597 PHOSPHOLIPID PLTLT NEUTRALIZ: CPT | Performed by: PHYSICIAN ASSISTANT

## 2020-12-18 PROCEDURE — 85730 THROMBOPLASTIN TIME PARTIAL: CPT | Performed by: INTERNAL MEDICINE

## 2020-12-18 PROCEDURE — 84466 ASSAY OF TRANSFERRIN: CPT | Performed by: PHYSICIAN ASSISTANT

## 2020-12-18 PROCEDURE — 85610 PROTHROMBIN TIME: CPT | Performed by: PHYSICIAN ASSISTANT

## 2020-12-18 PROCEDURE — 99223 1ST HOSP IP/OBS HIGH 75: CPT | Performed by: PHYSICIAN ASSISTANT

## 2020-12-18 PROCEDURE — 85730 THROMBOPLASTIN TIME PARTIAL: CPT | Performed by: STUDENT IN AN ORGANIZED HEALTH CARE EDUCATION/TRAINING PROGRAM

## 2020-12-18 PROCEDURE — 86147 CARDIOLIPIN ANTIBODY EA IG: CPT | Performed by: PHYSICIAN ASSISTANT

## 2020-12-18 PROCEDURE — 86146 BETA-2 GLYCOPROTEIN ANTIBODY: CPT | Performed by: PHYSICIAN ASSISTANT

## 2020-12-18 PROCEDURE — 84484 ASSAY OF TROPONIN QUANT: CPT | Performed by: PHYSICIAN ASSISTANT

## 2020-12-18 RX ORDER — GABAPENTIN 300 MG/1
600 CAPSULE ORAL NIGHTLY
Status: DISCONTINUED | OUTPATIENT
Start: 2020-12-18 | End: 2020-12-20 | Stop reason: HOSPADM

## 2020-12-18 RX ORDER — HYDROCHLOROTHIAZIDE 25 MG/1
25 TABLET ORAL DAILY
Status: DISCONTINUED | OUTPATIENT
Start: 2020-12-18 | End: 2020-12-20 | Stop reason: HOSPADM

## 2020-12-18 RX ORDER — ACETAMINOPHEN 325 MG/1
650 TABLET ORAL EVERY 6 HOURS PRN
Status: DISCONTINUED | OUTPATIENT
Start: 2020-12-18 | End: 2020-12-20 | Stop reason: HOSPADM

## 2020-12-18 RX ORDER — AZATHIOPRINE 50 MG/1
100 TABLET ORAL DAILY
Status: DISCONTINUED | OUTPATIENT
Start: 2020-12-18 | End: 2020-12-20 | Stop reason: HOSPADM

## 2020-12-18 RX ORDER — CYANOCOBALAMIN 1000 UG/ML
1000 INJECTION, SOLUTION INTRAMUSCULAR; SUBCUTANEOUS DAILY
Status: DISCONTINUED | OUTPATIENT
Start: 2020-12-18 | End: 2020-12-20 | Stop reason: HOSPADM

## 2020-12-18 RX ORDER — HYDROCODONE BITARTRATE AND ACETAMINOPHEN 7.5; 325 MG/1; MG/1
1 TABLET ORAL 2 TIMES DAILY PRN
Status: DISCONTINUED | OUTPATIENT
Start: 2020-12-18 | End: 2020-12-20 | Stop reason: HOSPADM

## 2020-12-18 RX ORDER — HYDROXYCHLOROQUINE SULFATE 200 MG/1
200 TABLET, FILM COATED ORAL DAILY
Status: DISCONTINUED | OUTPATIENT
Start: 2020-12-18 | End: 2020-12-20 | Stop reason: HOSPADM

## 2020-12-18 RX ORDER — HYDROCODONE BITARTRATE AND ACETAMINOPHEN 7.5; 325 MG/1; MG/1
1 TABLET ORAL DAILY PRN
COMMUNITY

## 2020-12-18 RX ORDER — VALSARTAN 160 MG/1
160 TABLET ORAL DAILY
Status: DISCONTINUED | OUTPATIENT
Start: 2020-12-18 | End: 2020-12-20 | Stop reason: HOSPADM

## 2020-12-18 RX ORDER — PANTOPRAZOLE SODIUM 40 MG/1
40 TABLET, DELAYED RELEASE ORAL
Status: DISCONTINUED | OUTPATIENT
Start: 2020-12-18 | End: 2020-12-20 | Stop reason: HOSPADM

## 2020-12-18 RX ORDER — NITROGLYCERIN 0.4 MG/1
0.4 TABLET SUBLINGUAL
Status: DISCONTINUED | OUTPATIENT
Start: 2020-12-18 | End: 2020-12-20 | Stop reason: HOSPADM

## 2020-12-18 RX ORDER — SODIUM CHLORIDE 0.9 % (FLUSH) 0.9 %
10 SYRINGE (ML) INJECTION AS NEEDED
Status: DISCONTINUED | OUTPATIENT
Start: 2020-12-18 | End: 2020-12-20 | Stop reason: HOSPADM

## 2020-12-18 RX ORDER — METOPROLOL SUCCINATE 50 MG/1
100 TABLET, EXTENDED RELEASE ORAL DAILY
Status: DISCONTINUED | OUTPATIENT
Start: 2020-12-18 | End: 2020-12-20 | Stop reason: HOSPADM

## 2020-12-18 RX ORDER — SODIUM CHLORIDE 0.9 % (FLUSH) 0.9 %
10 SYRINGE (ML) INJECTION EVERY 12 HOURS SCHEDULED
Status: DISCONTINUED | OUTPATIENT
Start: 2020-12-18 | End: 2020-12-20 | Stop reason: HOSPADM

## 2020-12-18 RX ORDER — METOPROLOL SUCCINATE 100 MG/1
100 TABLET, EXTENDED RELEASE ORAL DAILY
COMMUNITY

## 2020-12-18 RX ORDER — VALSARTAN 160 MG/1
160 TABLET ORAL DAILY
COMMUNITY

## 2020-12-18 RX ORDER — GABAPENTIN 300 MG/1
300 CAPSULE ORAL 2 TIMES DAILY
COMMUNITY

## 2020-12-18 RX ORDER — HYDRALAZINE HYDROCHLORIDE 20 MG/ML
10 INJECTION INTRAMUSCULAR; INTRAVENOUS EVERY 6 HOURS PRN
Status: DISCONTINUED | OUTPATIENT
Start: 2020-12-18 | End: 2020-12-20 | Stop reason: HOSPADM

## 2020-12-18 RX ADMIN — GABAPENTIN 600 MG: 300 CAPSULE ORAL at 20:25

## 2020-12-18 RX ADMIN — HEPARIN SODIUM 7.41 UNITS/KG/HR: 10000 INJECTION, SOLUTION INTRAVENOUS at 00:27

## 2020-12-18 RX ADMIN — METOPROLOL SUCCINATE 100 MG: 50 TABLET, EXTENDED RELEASE ORAL at 09:22

## 2020-12-18 RX ADMIN — PANTOPRAZOLE SODIUM 40 MG: 40 TABLET, DELAYED RELEASE ORAL at 05:29

## 2020-12-18 RX ADMIN — HEPARIN SODIUM 13.41 UNITS/KG/HR: 10000 INJECTION, SOLUTION INTRAVENOUS at 20:36

## 2020-12-18 RX ADMIN — CYANOCOBALAMIN 1000 MCG: 1000 INJECTION, SOLUTION INTRAMUSCULAR at 14:42

## 2020-12-18 RX ADMIN — AZATHIOPRINE 100 MG: 50 TABLET ORAL at 09:22

## 2020-12-18 RX ADMIN — VALSARTAN 160 MG: 160 TABLET, FILM COATED ORAL at 09:22

## 2020-12-18 RX ADMIN — SODIUM CHLORIDE, PRESERVATIVE FREE 10 ML: 5 INJECTION INTRAVENOUS at 10:15

## 2020-12-18 RX ADMIN — HYDROCODONE BITARTRATE AND ACETAMINOPHEN 1 TABLET: 7.5; 325 TABLET ORAL at 20:33

## 2020-12-18 RX ADMIN — HYDROXYCHLOROQUINE SULFATE 200 MG: 200 TABLET ORAL at 09:22

## 2020-12-18 RX ADMIN — HYDROCHLOROTHIAZIDE 25 MG: 25 TABLET ORAL at 09:22

## 2020-12-18 RX ADMIN — HEPARIN SODIUM 5000 UNITS: 5000 INJECTION INTRAVENOUS; SUBCUTANEOUS at 00:26

## 2020-12-18 NOTE — PROGRESS NOTES
Discharge Planning Assessment   [place]     Patient Name: Starla Sandhu  MRN: [id number]  Today's Date: 12/18/2020    Admit Date: 12/17/2020    Discharge Needs Assessment     Row Name 12/18/20 1402       Living Environment    Lives With  spouse;child(ro), dependent Pt is currently staying in a camper with her spouse and 13 Y/O son due to being in the process of building a house ([address] [place], [place]).    Current Living Arrangements  other (see comments)    Primary Care Provided by  self    Provides Primary Care For  no one    Family Caregiver if Needed  spouse    Quality of Family Relationships  helpful;involved;supportive    Able to Return to Prior Arrangements  yes       Transition Planning    Patient/Family Anticipates Transition to  home with family    Transportation Anticipated  family or friend will provide;car, drives self       Discharge Needs Assessment    Equipment Currently Used at Home  none    Discharge Facility/Level of Care Needs  -- Pt does not utilize home health services.        Discharge Plan     Row Name 12/18/20 1404       Plan    Plan Pt states she was approved for [place] Medicaid. Pt is currently staying in a camper with her spouse and 13 Y/O son due to her house being built and she plans to return there at discharge. Pt does not utilize home health or DME. Pt does not have a POA or living will. PCP is Radha Curtis. SS to follow and assist as needed with discharge planning.    Patient/Family in Agreement with Plan  yes        Demographic Summary     Row Name 12/18/20 1400       General Information    Referral Source  nursing    Reason for Consult  -- SS received consult discharge planning. SS spoke to pt.          JHOANA Biswas

## 2020-12-18 NOTE — ED NOTES
Patient alert and oriented. No visible signs of acute distress noted. Warm compress reapplied to extravasation site. IV remains in place. New IV access in place at this time. Patient awaiting repeat CT scan. Patient agreeable with treatment plan. Patient remains in results pending area.     Collette, Ashley N, RN  12/17/20 2485

## 2020-12-18 NOTE — ED NOTES
Pt transferred to inpatient floor at this time. NADN, skin PWD, no resp distress noted. IV intact with no signs of infiltration. All pt belongings transferred with pt. Pt transferred via wheelchair with ER tech; heprain infusing well.            Bela Pabon, RN  12/18/20 4302

## 2020-12-18 NOTE — ED NOTES
Report received from Kierra Campos RN at this time.   Pt resting quietly on stretcher with no complaints.  Pt AAOx4 with no resp distress noted, respirations even and unlabored.  Pt denies any needs at this time. Will continue to monitor and follow plan of care.  Bed rails up x2, bed in lowest position, call light in reach.           Bela Pabon, RN  12/18/20 2958

## 2020-12-18 NOTE — ED NOTES
Pt resting quietly on stretcher with no complaints.  Pt AAOx4 with no resp distress noted, respirations even and unlabored.  Pt denies any needs at this time. Will continue to monitor and follow plan of care.  Bed rails up x2, bed in lowest position, call light in reach.           Bela Pabon, RN  12/18/20 3589

## 2020-12-18 NOTE — ED NOTES
Patient returned from CT at this time, radiology reports pt's IV has infiltrated when flushed with normal saline. Assessed IV and removed at this time due to infiltration. Patient tolerated well. Patient verbalized approval to attempt another IV insertion in order to have CT scan. Provider made aware.     Collette, Ashley N, RN  12/17/20 5046

## 2020-12-18 NOTE — PLAN OF CARE
Goal Outcome Evaluation:  Plan of Care Reviewed With: patient   Pt resting in bed. No complaints. Heparin gtt infusing. Will cont to monitor

## 2020-12-18 NOTE — PHARMACY PATIENT ASSISTANCE
Pharmacy was consulted to evaluate the cost of NOACs for patient. The patient's Medicaid is currently pending, so we were not able to see what her copay will be. Dr. Reyes said he will most likely start Eliquis, so we have added a free trial card to her profile.     Thank you,    Conchita Strong, PharmD  12/18/20  15:14 EST

## 2020-12-18 NOTE — H&P
[facility] Medicine Services  HISTORY & PHYSICAL    Patient Identification:  Name:  Starla Sandhu  Age:  40 y.o.  Sex:  female  :  1980  MRN:  [id number]   Visit Number:  [id number]  Admit Date: 2020   Primary Care Physician:  Radha Curtis MD     Subjective     Chief complaint:   Chief Complaint   Patient presents with   • Abnormal Lab   • Shortness of Breath     History of presenting illness:   Patient is a 40 y.o. female with past medical history significant for essential hypertension, systemic lupus erythematosus, hypercholesterolemia, GERD, depression, anxiety, osteoarthritis, that presented to the [facility] emergency department for evaluation of shortness of breath.    The patient states that she has been experiencing shortness of breath for around 5 to 6 months but has noticed it progressively worsening over the last month.  She states that mainly her shortness of breath is with ambulation but is now occurring at rest.  She states that her mom passed away of a pulmonary embolism in 2019 and due to this history, she decided to see her primary care provider to be evaluated.  She saw her PCP yesterday who performed a D-dimer, which was elevated.  As result, her PCP recommended she come to the emergency department to be further evaluated.  She denies any personal history of blood clots, smoking tobacco use, or recent prolonged travel.  She does admit to being on Seasonique birth control.  In addition, she states that she is a  and currently works from home.  She states that she sits 8 or more hours a day but does try to ambulate every few hours.  She denies any recent illness, fever, chills, congestion, sore throat, coughing, wheezing, chest pain, leg edema, palpitations, abdominal pain, nausea, vomiting, diarrhea, dysuria, dizziness, lightheadedness, syncope.    Upon arrival to the ED, vitals were temperature 99.0 °F, pulse 84,  respirations 18, /85, SPO2 98% on room air.  Troponin T negative x1.  CMP of creatinine 1.18, GFR 51.  CRP 3.29.  D-dimer 2.43.  CBC with WBC 11.06, MCHC 30.9, absolute neutrophils 7.78.  CT of the chest with PE protocol shows bilateral pulmonary embolisms, RV/LV ratio 0.7.  COVID-19 negative.    In the emergency department the patient received IV heparin bolus, SC hyaluronidase 150 units, IV  mL bolus, IV heparin gtt.     Patient has been admitted to the telemetry floor for further evaluation and treatment.  ---------------------------------------------------------------------------------------------------------------------   Review of Systems   Constitutional: Negative for chills, diaphoresis and fever.   HENT: Negative for congestion and sore throat.    Eyes: Negative for discharge and visual disturbance.   Respiratory: Positive for shortness of breath. Negative for cough and wheezing.    Cardiovascular: Negative for chest pain, palpitations and leg swelling.   Gastrointestinal: Negative for abdominal pain, constipation, diarrhea, nausea and vomiting.   Endocrine: Negative for polydipsia and polyuria.   Genitourinary: Negative for dysuria and frequency.   Musculoskeletal: Negative for back pain and gait problem.   Skin: Negative for color change, rash and wound.   Neurological: Negative for dizziness, syncope and light-headedness.   Hematological: Does not bruise/bleed easily.   Psychiatric/Behavioral: Negative for confusion. The patient is not nervous/anxious.       ---------------------------------------------------------------------------------------------------------------------   Past Medical History:   Diagnosis Date   • Anxiety    • BMI 50.0-59.9, adult (CMS/[facility])    • Chronic back pain    • Degenerative disc disease, lumbar    • Depression    • GERD (gastroesophageal reflux disease)    • Hypercholesterolemia    • Hypertension     3 meds   • Lupus (CMS/[facility])     on azathioprine and plaquenil.   Extreme fatigue, arthalgias, rash   • Malignant melanoma of skin (CMS/HCC)     left shoulder, left abdomen.s/p wide local excision.  She sees a dermatologist in [place] for biyearly exams.   • Osteoarthritis      Past Surgical History:   Procedure Laterality Date   • COLONOSCOPY  2019   • ENDOSCOPY  2016   • EXPLORATORY LAPAROTOMY  1994    8 pound cyst removal on ovary at age 16.     • LAPAROSCOPIC CHOLECYSTECTOMY  2016   • LAPAROSCOPIC GASTRIC BANDING  12/17/2014    DR. CHUCK PEÑA   • TONSILLECTOMY  1985     Family History   Problem Relation Age of Onset   • Hypertension Mother    • Skin cancer Mother    • Pulmonary embolism Mother    • Heart attack Father    • Diabetes Father    • Hypertension Father    • No Known Problems Maternal Grandmother    • Lung cancer Maternal Grandfather    • Breast cancer Paternal Grandmother    • Heart attack Paternal Grandfather      Social History     Socioeconomic History   • Marital status:      Spouse name: Not on file   • Number of children: Not on file   • Years of education: Not on file   • Highest education level: Not on file   Tobacco Use   • Smoking status: Never Smoker   • Smokeless tobacco: Never Used   Substance and Sexual Activity   • Alcohol use: No   • Drug use: No   Social History Narrative    Pt lives in [place], [place] w/  Krzysztof. They have 1 child 15yo. 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      ---------------------------------------------------------------------------------------------------------------------   Allergies:  Rocephin [ceftriaxone]  ---------------------------------------------------------------------------------------------------------------------   Medications below are reported home medications pulling from within the system; at this time, these medications have not been reconciled unless otherwise specified and are in the verification process for further verifcation as current home  medications.    Prior to Admission Medications     Prescriptions Last Dose Informant Patient Reported? Taking?    azaTHIOprine (IMURAN) 50 MG tablet   Yes No    Take 50 mg by mouth 2 (two) times a day.    escitalopram (LEXAPRO) 20 MG tablet   Yes No    Take 20 mg by mouth Daily.    gabapentin (NEURONTIN) 100 MG capsule   Yes No    Take 100 mg by mouth 2 (two) times a day.    hydroCHLOROthiazide (HYDRODIURIL) 25 MG tablet   Yes No    Take 25 mg by mouth Daily.    HYDROcodone-acetaminophen (NORCO) 7.5-325 MG per tablet   No No    Take 1 tablet by mouth daily as needed for moderate pain (4-6).    hydroxychloroquine (PLAQUENIL) 200 MG tablet   Yes No    Take 200 mg by mouth 2 (Two) Times a Day.    metoprolol tartrate (LOPRESSOR) 100 MG tablet   Yes No    Take 100 mg by mouth Daily.        ---------------------------------------------------------------------------------------------------------------------    Objective     Hospital Scheduled Meds:  pantoprazole, 40 mg, Oral, Q AM  sodium chloride, 10 mL, Intravenous, Q12H      heparin (porcine), 7.41 Units/kg/hr, Last Rate: 7.41 Units/kg/hr (12/18/20 9725)  Pharmacy Consult,         Current listed hospital scheduled medications may not yet reflect those currently placed in orders that are signed and held, awaiting patient's arrival to floor/unit.    ---------------------------------------------------------------------------------------------------------------------   Vital Signs:  Temp:  [97.5 °F (36.4 °C)-99 °F (37.2 °C)] 97.5 °F (36.4 °C)  Heart Rate:  [69-84] 71  Resp:  [18] 18  BP: (120-160)/() 141/103  Mean Arterial Pressure (Non-Invasive) for the past 24 hrs (Last 3 readings):   Noninvasive MAP (mmHg)   12/18/20 0321 98   12/18/20 0217 101   12/18/20 0203 98     SpO2 Percentage    12/18/20 0217 12/18/20 0223 12/18/20 0321   SpO2: 99% 100% 98%     SpO2:  [98 %-100 %] 98 %  on   ;   Device (Oxygen Therapy): room air    Body mass index is 51.15 kg/m².  Wt  Readings from Last 3 Encounters:   12/18/20 (!) 139 kg (307 lb 6.4 oz)   10/05/20 135 kg (297 lb)   06/27/17 118 kg (260 lb 12.8 oz)     ---------------------------------------------------------------------------------------------------------------------   Physical Exam:  Physical Exam  Constitutional:       General: She is awake.      Appearance: Normal appearance. She is well-developed. She is morbidly obese.   HENT:      Head: Normocephalic and atraumatic.   Eyes:      General: Lids are normal.         Right eye: No discharge.         Left eye: No discharge.   Cardiovascular:      Rate and Rhythm: Normal rate and regular rhythm.      Pulses: Normal pulses. No decreased pulses.           Dorsalis pedis pulses are 2+ on the right side and 2+ on the left side.        Posterior tibial pulses are 2+ on the right side and 2+ on the left side.      Heart sounds: Normal heart sounds. No murmur. No friction rub. No gallop.    Pulmonary:      Effort: Pulmonary effort is normal. No tachypnea, bradypnea or respiratory distress.      Breath sounds: Normal breath sounds. No decreased breath sounds, wheezing, rhonchi or rales.   Abdominal:      Palpations: Abdomen is soft.      Tenderness: There is no abdominal tenderness.   Musculoskeletal:      Right lower leg: No edema.      Left lower leg: No edema.   Skin:     Findings: No abrasion, ecchymosis or erythema.   Neurological:      General: No focal deficit present.      Mental Status: She is alert and oriented to person, place, and time. Mental status is at baseline.   Psychiatric:         Speech: Speech normal.         Behavior: Behavior is cooperative.         Cognition and Memory: Cognition normal.       ---------------------------------------------------------------------------------------------------------------------  EKG:    EKG ordered and pending.     Telemetry:    Normal sinus rhythm, heart rate is 77 bpm, SPO2 99% on room air.    I have personally reviewed the  EKG/Telemetry strip  ---------------------------------------------------------------------------------------------------------------------   Results from last 7 days   Lab Units 12/17/20 1843   TROPONIN T ng/mL <0.010           Results from last 7 days   Lab Units 12/17/20 1843   CRP mg/dL 3.29*   WBC 10*3/mm3 11.06*   HEMOGLOBIN g/dL 11.9*   HEMATOCRIT % 38.5   MCV fL 92.1   MCHC g/dL 30.9*   PLATELETS 10*3/mm3 279   INR  1.04     Results from last 7 days   Lab Units 12/17/20 1843   SODIUM mmol/L 138   POTASSIUM mmol/L 4.2   CHLORIDE mmol/L 103   CO2 mmol/L 25.9   BUN mg/dL 13   CREATININE mg/dL 1.18*   EGFR IF NONAFRICN AM mL/min/1.73 51*   CALCIUM mg/dL 9.0   GLUCOSE mg/dL 96   ALBUMIN g/dL 3.72   BILIRUBIN mg/dL 0.2   ALK PHOS U/L 52   AST (SGOT) U/L 17   ALT (SGPT) U/L 9   Estimated Creatinine Clearance: 91.6 mL/min (A) (by C-G formula based on SCr of 1.18 mg/dL (H)).     Pain Management Panel     There is no flowsheet data to display.        I have personally reviewed the above laboratory results.   ---------------------------------------------------------------------------------------------------------------------  Imaging Results (Last 7 Days)     Procedure Component Value Units Date/Time    CT Chest Pulmonary Embolism [[id number]] Collected: 12/17/20 2347     Updated: 12/17/20 2349    Narrative:      CT CHEST WITH CONTRAST, PE PROTOCOL,    HISTORY:  Shortness of breath    TECHNIQUE:  CT examination of the chest with IV contrast. CTA MIP multiplanar pulmonary artery images were reformatted with 3-D postprocessing. Radiation dose reduction techniques included automated exposure control. Radiation audit for CT and nuclear cardiology  exams in the last 12 months: .    FINDINGS:  Filling defects are seen in the bilateral pulmonary artery main segmental branches and extend into the right main pulmonary artery. There is a right-sided arch. Lungs are grossly clear. No acute osseous abnormality. Upper abdominal  structures appear  within normal limits.    RV/LV ratio is 0.7      Impression:      1. There are bilateral pulmonary embolisms. RV/LV ratio is 0.7    2. Lung fields are grossly clear.    Critical findings were discussed with Dr. Babcock at 11:39 PM on 12/17/2020    Signer Name: Zoey Sparks MD   Signed: 12/17/2020 11:47 PM   Workstation Name: [id number]    [facility]        I have personally reviewed the above radiology results.      ---------------------------------------------------------------------------------------------------------------------    Assessment & Plan      Active Hospital Problems    Diagnosis POA   • **Pulmonary emboli (CMS/[facility]) [I26.99] Yes   • Lupus (systemic lupus erythematosus) (CMS/[facility]) [M32.9] Yes   • Hypercholesterolemia [E78.00] Yes   • GERD (gastroesophageal reflux disease) [K21.9] Yes   • Depression [F32.9] Yes   • Anxiety disorder [F41.9] Yes   • Osteoarthritis [M19.90] Yes   • Essential hypertension [I10] Yes   • Morbid obesity (CMS/[facility]) [E66.01] Yes     #Elevated d-dimer   #Bilateral pulmonary embolisms present upon admission   #Systemic Lupus Erythematosus  -D-dimer 2.43  -CT of the chest with PE protocol shows filling defects in the bilateral pulmonary artery main segmental branches and extend into the right main pulmonary artery; RV/LV ratio 0.7  -HCG negative   -Etiology of PE unclear at this time; possible due to antiphospholipid syndrome in setting of SLE; aCL IgG/M, anti-beta2 I antibodies IgG/M, and lupus anticoagulant panel ordered   -We will obtain transthoracic echo and doppler US of bilateral lower extremities  -IV heparin gtt started   -Pharmacy has been consulted to determine pricing of NOACs  -Recommend patient follows up with hematology in the outpatient setting for further workup   -Monitor closely     #MARZENA vs. CKD stage IIIa    -Cr 1.18, GFR 51; baseline Cr unknown  -Repeat AM CMP and monitor renal function closely   -Review home medications  once available     #Normocytic anemia   -H&H stable; repeat AM CBC and monitor H&H closely   -Obtain vitamin B12, folate, ferritin, iron, iron panel; replace as necessary  -If hgb <7, will transfuse     #Essential hypertension   #Hypercholesterolemia   -BP appears mildly uncontrolled  -Review home medications once available  -Will make IV hydralazine available for SBP >170    #GERD  -Protonix     #Anxiety  #Depression   -Supportive care  -Review home medications once available     #Morbid Obesity   -BMI 51.15 kg/m2      F/E/N: No IV fluids; replace electrolytes as necessary; regular diet   ---------------------------------------------------  DVT Prophylaxis: Heparin gtt  GI Prophylaxis: Protonix   Activity: Up with assistance    The patient is considered to be a high risk patient due to: bilateral pulmonary embolisms     INPATIENT status due to the need for care which can only be reasonably provided in an hospital setting such as aggressive/expedited ancillary services and/or consultation services, the necessity for IV medications, close physician monitoring and/or the possible need for procedures.  In such, I feel patient’s risk for adverse outcomes and need for care warrant INPATIENT evaluation and predict the patient’s care encounter to likely last beyond 2 midnights.    Code Status: FULL CODE     I have discussed the patient's assessment and plan with patient, nursing staff, and attending physician      Leighann José PA-C  Hospitalist Service --        12/18/20  04:07 EST    Attending Physician: Martha Painter DO

## 2020-12-18 NOTE — ED NOTES
Attempted to aspirate contrast solution from IV site. Able to aspirate approximately 0.5ML after multiple attempts.     Collette, Ashley N, RN  12/17/20 1959

## 2020-12-18 NOTE — ED NOTES
Pt resting quietly on stretcher with no complaints.  Pt AAOx4 with no resp distress noted, respirations even and unlabored.  Pt denies any needs at this time.  Skin PWD. Will continue to monitor and follow plan of care.  Bed rails up x2, bed in lowest position, call light in reach.           Bela Pabon, RN  12/18/20 8783

## 2020-12-18 NOTE — PROGRESS NOTES
[facility] Medicine Services  BRIEF PROGRESS NOTE    Patient admitted after midnight per night shift. Patient admitted with bilateral pulmonary emboli and MARZENA on stage IIIa CKD.  Please see admitting history and physical for further details.  Patient is currently on a heparin drip, pharmacy consult pending for NOACs.    Patient evaluated at bedside, family present bedside.  Patient sitting in bed, on room air.  No acute distress noted.    Echocardiogram and bilateral lower extremity venous Doppler pending.  Vitamin B-12 noted to be low, started on cyanocobalamin injections.  Discussed in detail with patient as well as family about treatment of pulmonary embolism.  She does further mention that she recently found out that her grandmother also had a clotting disorder that she was previously unaware of.  Discussed about inpatient labs as well as outpatient follow-up with hematology after discharge and further work-up outside the setting of an acute clot.  We will follow up on ultrasound and echo results.  Repeat labs in a.m.  Further care pending clinical course.  Please see chart for orders.      Karina Santamaria PA-C  Hospitalist Service -- [facility]   Pager: [phone]    12/18/20  09:59 EST    Attending Physician: Moose Reyes DO

## 2020-12-18 NOTE — PLAN OF CARE
Goal Outcome Evaluation:  Plan of Care Reviewed With: patient          Patient admitted from ER. Made comfortable in room. Heparin drip infusing. Patient has no complaints. Will continue to monitor and follow plan of care.

## 2020-12-19 ENCOUNTER — APPOINTMENT (OUTPATIENT)
Dept: CT IMAGING | Facility: HOSPITAL | Age: 40
End: 2020-12-19

## 2020-12-19 LAB
ANION GAP SERPL CALCULATED.3IONS-SCNC: 11.6 MMOL/L (ref 5–15)
APTT PPP: 62.4 SECONDS (ref 25.6–35.3)
APTT PPP: 72.8 SECONDS (ref 25.6–35.3)
BH CV ECHO MEAS - AO MAX PG (FULL): 4 MMHG
BH CV ECHO MEAS - AO MAX PG: 9.6 MMHG
BH CV ECHO MEAS - AO MEAN PG (FULL): 2 MMHG
BH CV ECHO MEAS - AO MEAN PG: 5 MMHG
BH CV ECHO MEAS - AO ROOT AREA (BSA CORRECTED): 1.2
BH CV ECHO MEAS - AO ROOT AREA: 6.6 CM^2
BH CV ECHO MEAS - AO ROOT DIAM: 2.9 CM
BH CV ECHO MEAS - AO V2 MAX: 155 CM/SEC
BH CV ECHO MEAS - AO V2 MEAN: 108 CM/SEC
BH CV ECHO MEAS - AO V2 VTI: 30.1 CM
BH CV ECHO MEAS - BSA(HAYCOCK): 2.6 M^2
BH CV ECHO MEAS - BSA: 2.4 M^2
BH CV ECHO MEAS - BZI_BMI: 51.1 KILOGRAMS/M^2
BH CV ECHO MEAS - BZI_METRIC_HEIGHT: 165.1 CM
BH CV ECHO MEAS - BZI_METRIC_WEIGHT: 139.3 KG
BH CV ECHO MEAS - EDV(CUBED): 155.7 ML
BH CV ECHO MEAS - EDV(MOD-SP2): 50.2 ML
BH CV ECHO MEAS - EDV(MOD-SP4): 77.4 ML
BH CV ECHO MEAS - EDV(TEICH): 140.1 ML
BH CV ECHO MEAS - EF(CUBED): 67.5 %
BH CV ECHO MEAS - EF(MOD-SP2): 62.7 %
BH CV ECHO MEAS - EF(MOD-SP4): 61.1 %
BH CV ECHO MEAS - EF(TEICH): 58.5 %
BH CV ECHO MEAS - ESV(CUBED): 50.7 ML
BH CV ECHO MEAS - ESV(MOD-SP2): 18.7 ML
BH CV ECHO MEAS - ESV(MOD-SP4): 30.1 ML
BH CV ECHO MEAS - ESV(TEICH): 58.1 ML
BH CV ECHO MEAS - FS: 31.2 %
BH CV ECHO MEAS - IVS/LVPW: 1.1
BH CV ECHO MEAS - IVSD: 0.88 CM
BH CV ECHO MEAS - LA DIMENSION: 3.9 CM
BH CV ECHO MEAS - LA/AO: 1.3
BH CV ECHO MEAS - LV DIASTOLIC VOL/BSA (35-75): 32.6 ML/M^2
BH CV ECHO MEAS - LV MASS(C)D: 165.1 GRAMS
BH CV ECHO MEAS - LV MASS(C)DI: 69.6 GRAMS/M^2
BH CV ECHO MEAS - LV MAX PG: 5.6 MMHG
BH CV ECHO MEAS - LV MEAN PG: 3 MMHG
BH CV ECHO MEAS - LV SYSTOLIC VOL/BSA (12-30): 12.7 ML/M^2
BH CV ECHO MEAS - LV V1 MAX: 118 CM/SEC
BH CV ECHO MEAS - LV V1 MEAN: 84.5 CM/SEC
BH CV ECHO MEAS - LV V1 VTI: 24.9 CM
BH CV ECHO MEAS - LVIDD: 5.4 CM
BH CV ECHO MEAS - LVIDS: 3.7 CM
BH CV ECHO MEAS - LVLD AP2: 5.9 CM
BH CV ECHO MEAS - LVLD AP4: 7.3 CM
BH CV ECHO MEAS - LVLS AP2: 5 CM
BH CV ECHO MEAS - LVLS AP4: 5.9 CM
BH CV ECHO MEAS - LVPWD: 0.82 CM
BH CV ECHO MEAS - MR MAX PG: 44.6 MMHG
BH CV ECHO MEAS - MR MAX VEL: 334 CM/SEC
BH CV ECHO MEAS - MV A MAX VEL: 97.8 CM/SEC
BH CV ECHO MEAS - MV DEC SLOPE: 474.5 CM/SEC^2
BH CV ECHO MEAS - MV DEC TIME: 0.21 SEC
BH CV ECHO MEAS - MV E MAX VEL: 100.2 CM/SEC
BH CV ECHO MEAS - MV E/A: 1
BH CV ECHO MEAS - MV MAX PG: 4.3 MMHG
BH CV ECHO MEAS - MV MEAN PG: 2 MMHG
BH CV ECHO MEAS - MV V2 MAX: 104 CM/SEC
BH CV ECHO MEAS - MV V2 MEAN: 64.4 CM/SEC
BH CV ECHO MEAS - MV V2 VTI: 28.2 CM
BH CV ECHO MEAS - PA ACC TIME: 0.13 SEC
BH CV ECHO MEAS - PA MAX PG: 3.8 MMHG
BH CV ECHO MEAS - PA MEAN PG: 2 MMHG
BH CV ECHO MEAS - PA PR(ACCEL): 20.5 MMHG
BH CV ECHO MEAS - PA V2 MAX: 97.6 CM/SEC
BH CV ECHO MEAS - PA V2 MEAN: 73 CM/SEC
BH CV ECHO MEAS - PA V2 VTI: 21.8 CM
BH CV ECHO MEAS - RAP SYSTOLE: 10 MMHG
BH CV ECHO MEAS - RVSP: 30.4 MMHG
BH CV ECHO MEAS - SI(AO): 83.8 ML/M^2
BH CV ECHO MEAS - SI(CUBED): 44.3 ML/M^2
BH CV ECHO MEAS - SI(MOD-SP2): 13.3 ML/M^2
BH CV ECHO MEAS - SI(MOD-SP4): 19.9 ML/M^2
BH CV ECHO MEAS - SI(TEICH): 34.5 ML/M^2
BH CV ECHO MEAS - SV(AO): 198.8 ML
BH CV ECHO MEAS - SV(CUBED): 105.1 ML
BH CV ECHO MEAS - SV(MOD-SP2): 31.5 ML
BH CV ECHO MEAS - SV(MOD-SP4): 47.3 ML
BH CV ECHO MEAS - SV(TEICH): 82 ML
BH CV ECHO MEAS - TR MAX VEL: 226 CM/SEC
BUN SERPL-MCNC: 10 MG/DL (ref 6–20)
BUN/CREAT SERPL: 9.5 (ref 7–25)
CALCIUM SPEC-SCNC: 8.9 MG/DL (ref 8.6–10.5)
CHLORIDE SERPL-SCNC: 105 MMOL/L (ref 98–107)
CO2 SERPL-SCNC: 23.4 MMOL/L (ref 22–29)
CREAT SERPL-MCNC: 1.05 MG/DL (ref 0.57–1)
DEPRECATED RDW RBC AUTO: 43.6 FL (ref 37–54)
ERYTHROCYTE [DISTWIDTH] IN BLOOD BY AUTOMATED COUNT: 12.9 % (ref 12.3–15.4)
GFR SERPL CREATININE-BSD FRML MDRD: 59 ML/MIN/1.73
GLUCOSE SERPL-MCNC: 98 MG/DL (ref 65–99)
HCT VFR BLD AUTO: 36.2 % (ref 34–46.6)
HGB BLD-MCNC: 11.2 G/DL (ref 12–15.9)
MAGNESIUM SERPL-MCNC: 1.8 MG/DL (ref 1.6–2.6)
MAXIMAL PREDICTED HEART RATE: 182 BPM
MCH RBC QN AUTO: 28.4 PG (ref 26.6–33)
MCHC RBC AUTO-ENTMCNC: 30.9 G/DL (ref 31.5–35.7)
MCV RBC AUTO: 91.6 FL (ref 79–97)
PLATELET # BLD AUTO: 282 10*3/MM3 (ref 140–450)
PMV BLD AUTO: 10.5 FL (ref 6–12)
POTASSIUM SERPL-SCNC: 4.1 MMOL/L (ref 3.5–5.2)
RBC # BLD AUTO: 3.95 10*6/MM3 (ref 3.77–5.28)
SODIUM SERPL-SCNC: 140 MMOL/L (ref 136–145)
STRESS TARGET HR: 155 BPM
WBC # BLD AUTO: 7.94 10*3/MM3 (ref 3.4–10.8)

## 2020-12-19 PROCEDURE — 63710000001 AZATHIOPRINE PER 50 MG: Performed by: INTERNAL MEDICINE

## 2020-12-19 PROCEDURE — 85730 THROMBOPLASTIN TIME PARTIAL: CPT | Performed by: STUDENT IN AN ORGANIZED HEALTH CARE EDUCATION/TRAINING PROGRAM

## 2020-12-19 PROCEDURE — 99233 SBSQ HOSP IP/OBS HIGH 50: CPT | Performed by: PHYSICIAN ASSISTANT

## 2020-12-19 PROCEDURE — 25010000002 MAGNESIUM SULFATE IN D5W 1G/100ML (PREMIX) 1-5 GM/100ML-% SOLUTION: Performed by: STUDENT IN AN ORGANIZED HEALTH CARE EDUCATION/TRAINING PROGRAM

## 2020-12-19 PROCEDURE — 80048 BASIC METABOLIC PNL TOTAL CA: CPT | Performed by: PHYSICIAN ASSISTANT

## 2020-12-19 PROCEDURE — 70450 CT HEAD/BRAIN W/O DYE: CPT

## 2020-12-19 PROCEDURE — 81241 F5 GENE: CPT | Performed by: PHYSICIAN ASSISTANT

## 2020-12-19 PROCEDURE — 85027 COMPLETE CBC AUTOMATED: CPT | Performed by: PHYSICIAN ASSISTANT

## 2020-12-19 PROCEDURE — 25010000002 CYANOCOBALAMIN PER 1000 MCG: Performed by: PHYSICIAN ASSISTANT

## 2020-12-19 PROCEDURE — 81240 F2 GENE: CPT | Performed by: PHYSICIAN ASSISTANT

## 2020-12-19 PROCEDURE — 83735 ASSAY OF MAGNESIUM: CPT | Performed by: PHYSICIAN ASSISTANT

## 2020-12-19 RX ORDER — MAGNESIUM SULFATE 1 G/100ML
1 INJECTION INTRAVENOUS ONCE
Status: COMPLETED | OUTPATIENT
Start: 2020-12-19 | End: 2020-12-19

## 2020-12-19 RX ORDER — MAGNESIUM SULFATE HEPTAHYDRATE 40 MG/ML
2 INJECTION, SOLUTION INTRAVENOUS AS NEEDED
Status: DISCONTINUED | OUTPATIENT
Start: 2020-12-19 | End: 2020-12-20 | Stop reason: HOSPADM

## 2020-12-19 RX ORDER — POTASSIUM CHLORIDE 1.5 G/1.77G
40 POWDER, FOR SOLUTION ORAL AS NEEDED
Status: DISCONTINUED | OUTPATIENT
Start: 2020-12-19 | End: 2020-12-20 | Stop reason: HOSPADM

## 2020-12-19 RX ORDER — POTASSIUM CHLORIDE 750 MG/1
40 TABLET, FILM COATED, EXTENDED RELEASE ORAL AS NEEDED
Status: DISCONTINUED | OUTPATIENT
Start: 2020-12-19 | End: 2020-12-20 | Stop reason: HOSPADM

## 2020-12-19 RX ORDER — MAGNESIUM SULFATE 1 G/100ML
1 INJECTION INTRAVENOUS AS NEEDED
Status: DISCONTINUED | OUTPATIENT
Start: 2020-12-19 | End: 2020-12-20 | Stop reason: HOSPADM

## 2020-12-19 RX ORDER — ONDANSETRON 2 MG/ML
4 INJECTION INTRAMUSCULAR; INTRAVENOUS EVERY 6 HOURS PRN
Status: DISCONTINUED | OUTPATIENT
Start: 2020-12-19 | End: 2020-12-20 | Stop reason: HOSPADM

## 2020-12-19 RX ADMIN — GABAPENTIN 600 MG: 300 CAPSULE ORAL at 22:38

## 2020-12-19 RX ADMIN — HYDROXYCHLOROQUINE SULFATE 200 MG: 200 TABLET ORAL at 08:35

## 2020-12-19 RX ADMIN — HYDROCHLOROTHIAZIDE 25 MG: 25 TABLET ORAL at 08:35

## 2020-12-19 RX ADMIN — AZATHIOPRINE 100 MG: 50 TABLET ORAL at 08:35

## 2020-12-19 RX ADMIN — CYANOCOBALAMIN 1000 MCG: 1000 INJECTION, SOLUTION INTRAMUSCULAR at 08:35

## 2020-12-19 RX ADMIN — APIXABAN 10 MG: 5 TABLET, FILM COATED ORAL at 10:35

## 2020-12-19 RX ADMIN — ACETAMINOPHEN 650 MG: 325 TABLET ORAL at 17:11

## 2020-12-19 RX ADMIN — HYDROCODONE BITARTRATE AND ACETAMINOPHEN 1 TABLET: 7.5; 325 TABLET ORAL at 22:38

## 2020-12-19 RX ADMIN — PANTOPRAZOLE SODIUM 40 MG: 40 TABLET, DELAYED RELEASE ORAL at 05:53

## 2020-12-19 RX ADMIN — SODIUM CHLORIDE, PRESERVATIVE FREE 10 ML: 5 INJECTION INTRAVENOUS at 08:36

## 2020-12-19 RX ADMIN — MAGNESIUM SULFATE IN DEXTROSE 1 G: 10 INJECTION, SOLUTION INTRAVENOUS at 12:42

## 2020-12-19 RX ADMIN — VALSARTAN 160 MG: 160 TABLET, FILM COATED ORAL at 08:35

## 2020-12-19 RX ADMIN — APIXABAN 10 MG: 5 TABLET, FILM COATED ORAL at 22:38

## 2020-12-19 RX ADMIN — METOPROLOL SUCCINATE 100 MG: 50 TABLET, EXTENDED RELEASE ORAL at 10:35

## 2020-12-19 NOTE — PLAN OF CARE
Goal Outcome Evaluation:  Plan of Care Reviewed With: patient  Progress: improving   Pt tolerating the shift well and resting in bed at this time. Still remains on room air with saturations in the 90's. Continuous heparin gtt infusing at this time.

## 2020-12-19 NOTE — PROGRESS NOTES
[facility] Medicine Services  PROGRESS NOTE     Patient Identification:  Name:  Starla Sandhu  Age:  40 y.o.  Sex:  female  :  1980  MRN:  [id number]  Visit Number:  [id number]  Primary Care Provider:  Radha Curtis MD    Length of stay:  1    ----------------------------------------------------------------------------------------------------------------------  Subjective     Chief Complaint:  Follow up for PE    History of Presenting Illness/Hospital Course:  Patient is a 40-year-old female with past medical history significant for systemic lupus erythematosus, stage IIIa CKD, essential hypertension, hyperlipidemia, GERD, history of gastric banding , morbid obesity, and anxiety/depression that presented to the [facility] emergency department on 2020 for evaluation of shortness of breath.  Please see admitting history and physical for further details.  Patient was noted to have bilateral pulmonary emboli in the main segmental branches extending into the right main pulmonary artery.  Patient does have family history of clotting disorder in her grandmother as well as mother who recently passed from a presumed pulmonary embolism post surgery.  Patient is without personal history of clotting.  She is on birth control medication, she is a non-smoker.  She did report being more sedentary as of recently.  Patient was initially placed on a heparin drip for which she tolerated well with no bleeding.  She has been transitioned to p.o. Eliquis.  She has remained on room air.  Echocardiogram pending.    Subjective:  Today, the patient was sitting up in bed per my evaluation this morning. No acute distress noted. No family present at bedside. No overnight events or issues reported per AM CAPRI Garg. She reports feeling better this morning. Still dyspneic on exertion. Denies any chest pain. Denies any fevers or chills, no cough. No abdominal  pain, nausea, vomiting, change in BMs. Denies any headache or dizziness.     Present during exam: N/A   ----------------------------------------------------------------------------------------------------------------------  Objective     Consults:  • None     Procedures:  • 12/18/2020: Transthoracic echocardiogram   o Pending cardiology read    [facility] [place] Meds:  apixaban, 10 mg, Oral, Q12H    Followed by  [START ON 12/26/2020] apixaban, 5 mg, Oral, Q12H  azaTHIOprine, 100 mg, Oral, Daily  cyanocobalamin, 1,000 mcg, Intramuscular, Daily  gabapentin, 600 mg, Oral, Nightly  hydroCHLOROthiazide, 25 mg, Oral, Daily  hydroxychloroquine, 200 mg, Oral, Daily  metoprolol succinate XL, 100 mg, Oral, Daily  pantoprazole, 40 mg, Oral, Q AM  sodium chloride, 10 mL, Intravenous, Q12H  valsartan, 160 mg, Oral, Daily      Pharmacy Consult,       ----------------------------------------------------------------------------------------------------------------------  Vital Signs:  Temp:  [97.9 °F (36.6 °C)-98.6 °F (37 °C)] 98.6 °F (37 °C)  Heart Rate:  [73-78] 73  Resp:  [18-24] 18  BP: (124-138)/(66-86) 136/86  Mean Arterial Pressure (Non-Invasive) for the past 24 hrs (Last 3 readings):   Noninvasive MAP (mmHg)   12/19/20 0623 108   12/19/20 0238 101   12/18/20 1819 96     SpO2 Percentage    12/18/20 1819 12/19/20 0238 12/19/20 0623   SpO2: 92% 95% 97%     SpO2:  [92 %-97 %] 97 %  on   ;   Device (Oxygen Therapy): room air    Body mass index is 50.99 kg/m².  Wt Readings from Last 3 Encounters:   12/19/20 (!) 139 kg (306 lb 6.4 oz)   10/05/20 135 kg (297 lb)   06/27/17 118 kg (260 lb 12.8 oz)        Intake/Output Summary (Last 24 hours) at 12/19/2020 0859  Last data filed at 12/18/2020 1923  Gross per 24 hour   Intake 720 ml   Output 1050 ml   Net -330 ml     Diet Regular  ----------------------------------------------------------------------------------------------------------------------  Physical exam:  Physical Exam  Nursing  note reviewed.   Constitutional:       General: She is awake. She is not in acute distress.     Appearance: She is well-developed. She is morbidly obese. She is not toxic-appearing.      Comments: Pleasant, sitting up in bed, no acute distress noted. On room air. No family present at bedside.    HENT:      Head: Normocephalic and atraumatic.      Mouth/Throat:      Mouth: Mucous membranes are moist.   Eyes:      Conjunctiva/sclera: Conjunctivae normal.      Pupils: Pupils are equal, round, and reactive to light.   Neck:      Musculoskeletal: Normal range of motion and neck supple.   Cardiovascular:      Rate and Rhythm: Normal rate and regular rhythm.      Pulses:           Dorsalis pedis pulses are 2+ on the right side and 2+ on the left side.        Posterior tibial pulses are 2+ on the right side and 2+ on the left side.      Heart sounds: Normal heart sounds. No murmur. No friction rub. No gallop.    Pulmonary:      Effort: Pulmonary effort is normal. No tachypnea, accessory muscle usage or respiratory distress.      Breath sounds: Normal breath sounds and air entry. No wheezing, rhonchi or rales.      Comments: On room air. Speaks in full sentences without dyspnea.  Abdominal:      General: Abdomen is protuberant. Bowel sounds are normal.      Palpations: Abdomen is soft.      Tenderness: There is no abdominal tenderness.   Genitourinary:     Comments: No cam catheter in place.  Musculoskeletal:      Right lower leg: No edema.      Left lower leg: No edema.   Skin:     General: Skin is warm and dry.      Capillary Refill: Capillary refill takes less than 2 seconds.      Findings: No abscess, erythema, lesion or wound.   Neurological:      General: No focal deficit present.      Mental Status: She is alert and oriented to person, place, and time.      Cranial Nerves: Cranial nerves are intact.      Sensory: Sensation is intact.      Motor: Motor function is intact.      Comments: Awake and alert. Follows  commands. Answers questions appropriately. Moves all extremities equally. Strength and sensation intact. No focal neuro deficit on exam.   Psychiatric:         Attention and Perception: Attention normal.         Mood and Affect: Mood and affect normal.         Speech: Speech normal.         Behavior: Behavior normal. Behavior is cooperative.         Thought Content: Thought content normal.         Cognition and Memory: Cognition and memory normal.         Judgment: Judgment normal.        ----------------------------------------------------------------------------------------------------------------------  Tele:    Sinus 70s    I have personally reviewed the EKG/Telemetry strips   ----------------------------------------------------------------------------------------------------------------------  Results from last 7 days   Lab Units 12/18/20  1010 12/18/20  0431 12/17/20  1843   TROPONIN T ng/mL <0.010 <0.010 <0.010           Results from last 7 days   Lab Units 12/19/20  0116 12/18/20  0431 12/17/20  1843   CRP mg/dL  --   --  3.29*   WBC 10*3/mm3 7.94 11.56* 11.06*   HEMOGLOBIN g/dL 11.2* 12.1 11.9*   HEMATOCRIT % 36.2 40.2 38.5   MCV fL 91.6 93.9 92.1   MCHC g/dL 30.9* 30.1* 30.9*   PLATELETS 10*3/mm3 282 267 279   INR   --  1.07 1.04     Results from last 7 days   Lab Units 12/19/20  0116 12/18/20  0431 12/17/20  1843   SODIUM mmol/L 140 138 138   POTASSIUM mmol/L 4.1 3.9 4.2   MAGNESIUM mg/dL 1.8 1.8  --    CHLORIDE mmol/L 105 104 103   CO2 mmol/L 23.4 22.8 25.9   BUN mg/dL 10 12 13   CREATININE mg/dL 1.05* 1.06* 1.18*   EGFR IF NONAFRICN AM mL/min/1.73 59* 58* 51*   CALCIUM mg/dL 8.9 8.7 9.0   GLUCOSE mg/dL 98 78 96   ALBUMIN g/dL  --  3.85 3.72   BILIRUBIN mg/dL  --  0.3 0.2   ALK PHOS U/L  --  52 52   AST (SGOT) U/L  --  15 17   ALT (SGPT) U/L  --  9 9   Estimated Creatinine Clearance: 103 mL/min (A) (by C-G formula based on SCr of 1.05 mg/dL (H)).    Lab Results   Component Value Date    HGBA1C 5.20  12/18/2020     Lab Results   Component Value Date    TSH 2.900 12/18/2020     Microbiology Results (last 10 days)     Procedure Component Value - Date/Time    COVID-19 and FLU A/B PCR - Swab, Nasopharynx [[id number]]  (Normal) Collected: 12/18/20 0131    Lab Status: Final result Specimen: Swab from Nasopharynx Updated: 12/18/20 0201     COVID19 Not Detected     Influenza A PCR Not Detected     Influenza B PCR Not Detected    Narrative:      Fact sheet for providers: https://www.fda.gov/media/233962/download    Fact sheet for patients: https://www.fda.gov/media/129297/download    Test performed by PCR.         I have personally reviewed the above laboratory results.   ----------------------------------------------------------------------------------------------------------------------  Imaging Results (Last 24 Hours)     Procedure Component Value Units Date/Time    US Venous Doppler Lower Extremity Bilateral (duplex) [[id number]] Collected: 12/18/20 1337     Updated: 12/18/20 1340    Narrative:      Multiple real-time images were obtained. The deep veins were well  demonstrated sonographically. There is good color doppler signal seen  filling the deep veins. They were completely compressed by the  ultrasound transducer. There was good spontaneous venous flow and  augmentation. There are no echoes seen along the deep veins to suggest  clot.    Impression:      No sonographic findings of DVT in the lower extremities     This report was finalized on 12/18/2020 1:38 PM by Dr. Moose Vazquez II, MD.     12/17/2020 CT Chest PE Protocol   FINDINGS:  Filling defects are seen in the bilateral pulmonary artery main segmental branches and extend into the right main pulmonary artery. There is a right-sided arch. Lungs are grossly clear. No acute osseous abnormality. Upper abdominal structures appear  within normal limits.  RV/LV ratio is 0.7     IMPRESSION:  1. There are bilateral pulmonary embolisms. RV/LV ratio is 0.7  2. Lung  fields are grossly clear.     Signer Name: Zoey Sparks MD  Signed: 12/17/2020 11:47 PM  Workstation Name: [id number]   [facility]    I have personally reviewed the above radiology results.   ----------------------------------------------------------------------------------------------------------------------  Assessment/Plan     -Bilateral pulmonary emboli (main segmental branches and extending into the right main pulmonary artery)   -Elevated D Dimer   -Dyspnea on exertion   · Unclear etiology at this time, multifactorial. Unclear if provoked. Potential provoking factors include but not limited to SLE/antiphospholipid syndrome, obesity, sedentary lifestyle, young age and familial clotting disorder, birth control  · ECHO pending  · US venous doppler of bilateral lower extremities negative for DVT  · Heparin gtt discontinued, transitioned to PO Eliquis. Unclear on duration of anticoagulation, but likely life long due to family history and personal risk factors. Risks/benefits of anticoagulation discussed with patient, verbalized understanding.She understands risks of bleeding, should spontaneous or abnormal bleeding occur, patient understand to seek immediate medical attention.   · Patient will be scheduled to follow up with hematology post discharge for further workup. Hypercoaguable work up limited as it can be skewed in setting of acute thrombosis. Will proceed with Factor V Leiden and Prothrombin gene mutation today.   · Monitor closely for sign/symptoms of bleeding   · Ambulate patient per shift, obtain walking oximetry on room air  · Repeat CBC in AM    -SLE  · Lupus anticoagulant panel, anti-cardiolipin antibody, and antibeta-2 glycoprotein 1 antibody pending  · Continue home Imuran and Plaquenil   · Continue outpatient follow up with rheumatology    -Stage IIIa CKD   · Creatinine stable.  · Closely monitor renal function. Avoid nephrotoxins as much as possible.  · Repeat chemistry  panel in AM.    -Essential hypertension   -Hyperlipidemia   · Blood pressure moderately controlled. Continue home hydrochlorothiazide, metoprolol, and valsartan with holding parameters to prevent hypotension and/or bradycardia. Closely monitor blood pressure per hospital protocol and titrate medications as necessary.  · Patient with history of hypercholesterolemia, she is not on a statin per review of her home medications. Obtain lipid panel.    -GERD  -History of gastric banding   · PPI    -Vitamin B12 deficiency   · Started on cyanocobalamin injections previously    -Hypomagnesemia   · Replace per protocol. Tele monitoring in place   · Repeat mag level in AM    -Anxiety/Depression   · Supportive care     -Morbid obesity, BMI 50.99  · Affecting all aspects of care     --------------------------------------------------  DVT Prophylaxis: Eliquis   GI Prophylaxis: PPI  FEN: No IV fluids. Replace electrolytes per protocol as necessary. Regular diet   Activity: Up with assistance as tolerated  Home O2: None  Disposition: Likely home in 24 hours   --------------------------------------------------    The patient is high risk due to the following diagnoses/reasons:  PE, family history of clotting, several potential provoking factors, obesity, SLE, CKD    I have discussed the patient's assessment and plan with the patient, AM CAPRI Garg, and attending physician Dr. Amy DO.       Karina Santamaria PA-C  Hospitalist Service -- [facility]   Pager: [phone]    12/19/20  08:59 EST    Attending Physician: Moose Reyes DO    ----------------------------------------------------------------------------------------------------------------------

## 2020-12-19 NOTE — PROGRESS NOTES
[facility] Medicine Services  CROSS COVER NOTE    Contacted per CAPRI Garg. Patient complaining of headache and not feeling well. Pt c/o nausea, flushing, and clammy. Patient concerned she is having a reaction to her Eliquis that was started today.    STAT Head CT without contrast obtained due to acute onset headache without neurological deficits after starting anticoagulation. Head CT resulted without any acute intracranial abnormalities. PRN antiemetics administered, resolving nausea symptoms. PRN tylenol available for headache. Vitals stable.     Further care pending clinical course. Continue orders as written in chart.       Karina Santamaria PA-C  Hospitalist Service -- [facility]   Pager: [phone]    12/19/20  18:59 EST    Attending Physician: Moose Reyes DO

## 2020-12-19 NOTE — PLAN OF CARE
Goal Outcome Evaluation:  Plan of Care Reviewed With: patient  Progress: improving       Pt resting in bed. No acute s/s distress noted. Will monitor.

## 2020-12-20 VITALS
SYSTOLIC BLOOD PRESSURE: 129 MMHG | HEART RATE: 82 BPM | DIASTOLIC BLOOD PRESSURE: 75 MMHG | OXYGEN SATURATION: 96 % | TEMPERATURE: 97.7 F | HEIGHT: 65 IN | WEIGHT: 293 LBS | BODY MASS INDEX: 48.82 KG/M2 | RESPIRATION RATE: 16 BRPM

## 2020-12-20 LAB
ANION GAP SERPL CALCULATED.3IONS-SCNC: 11.2 MMOL/L (ref 5–15)
BUN SERPL-MCNC: 8 MG/DL (ref 6–20)
BUN/CREAT SERPL: 6.9 (ref 7–25)
CALCIUM SPEC-SCNC: 9.1 MG/DL (ref 8.6–10.5)
CHLORIDE SERPL-SCNC: 105 MMOL/L (ref 98–107)
CHOLEST SERPL-MCNC: 203 MG/DL (ref 0–200)
CO2 SERPL-SCNC: 23.8 MMOL/L (ref 22–29)
CREAT SERPL-MCNC: 1.16 MG/DL (ref 0.57–1)
DEPRECATED RDW RBC AUTO: 43.8 FL (ref 37–54)
ERYTHROCYTE [DISTWIDTH] IN BLOOD BY AUTOMATED COUNT: 13 % (ref 12.3–15.4)
GFR SERPL CREATININE-BSD FRML MDRD: 52 ML/MIN/1.73
GLUCOSE SERPL-MCNC: 95 MG/DL (ref 65–99)
HCT VFR BLD AUTO: 37.9 % (ref 34–46.6)
HDLC SERPL-MCNC: 56 MG/DL (ref 40–60)
HGB BLD-MCNC: 11.9 G/DL (ref 12–15.9)
LDLC SERPL CALC-MCNC: 124 MG/DL (ref 0–100)
LDLC/HDLC SERPL: 2.15 {RATIO}
MAGNESIUM SERPL-MCNC: 1.7 MG/DL (ref 1.6–2.6)
MCH RBC QN AUTO: 28.9 PG (ref 26.6–33)
MCHC RBC AUTO-ENTMCNC: 31.4 G/DL (ref 31.5–35.7)
MCV RBC AUTO: 92 FL (ref 79–97)
PHOSPHATE SERPL-MCNC: 3.3 MG/DL (ref 2.5–4.5)
PLATELET # BLD AUTO: 298 10*3/MM3 (ref 140–450)
PMV BLD AUTO: 11.4 FL (ref 6–12)
POTASSIUM SERPL-SCNC: 4.2 MMOL/L (ref 3.5–5.2)
RBC # BLD AUTO: 4.12 10*6/MM3 (ref 3.77–5.28)
SODIUM SERPL-SCNC: 140 MMOL/L (ref 136–145)
TRIGL SERPL-MCNC: 132 MG/DL (ref 0–150)
VLDLC SERPL-MCNC: 23 MG/DL (ref 5–40)
WBC # BLD AUTO: 7.46 10*3/MM3 (ref 3.4–10.8)

## 2020-12-20 PROCEDURE — 63710000001 AZATHIOPRINE PER 50 MG: Performed by: INTERNAL MEDICINE

## 2020-12-20 PROCEDURE — 80061 LIPID PANEL: CPT | Performed by: PHYSICIAN ASSISTANT

## 2020-12-20 PROCEDURE — 84100 ASSAY OF PHOSPHORUS: CPT | Performed by: PHYSICIAN ASSISTANT

## 2020-12-20 PROCEDURE — 99239 HOSP IP/OBS DSCHRG MGMT >30: CPT | Performed by: PHYSICIAN ASSISTANT

## 2020-12-20 PROCEDURE — 83735 ASSAY OF MAGNESIUM: CPT | Performed by: PHYSICIAN ASSISTANT

## 2020-12-20 PROCEDURE — 85027 COMPLETE CBC AUTOMATED: CPT | Performed by: PHYSICIAN ASSISTANT

## 2020-12-20 PROCEDURE — 80048 BASIC METABOLIC PNL TOTAL CA: CPT | Performed by: PHYSICIAN ASSISTANT

## 2020-12-20 PROCEDURE — 25010000002 CYANOCOBALAMIN PER 1000 MCG: Performed by: PHYSICIAN ASSISTANT

## 2020-12-20 RX ORDER — MAGNESIUM SULFATE 1 G/100ML
1 INJECTION INTRAVENOUS ONCE
Status: DISCONTINUED | OUTPATIENT
Start: 2020-12-20 | End: 2020-12-20 | Stop reason: HOSPADM

## 2020-12-20 RX ORDER — CYANOCOBALAMIN 1000 UG/ML
1000 INJECTION, SOLUTION INTRAMUSCULAR; SUBCUTANEOUS DAILY
Qty: 5 ML | Refills: 0
Start: 2020-12-20 | End: 2020-12-25

## 2020-12-20 RX ADMIN — AZATHIOPRINE 100 MG: 50 TABLET ORAL at 09:30

## 2020-12-20 RX ADMIN — HYDROXYCHLOROQUINE SULFATE 200 MG: 200 TABLET ORAL at 09:30

## 2020-12-20 RX ADMIN — CYANOCOBALAMIN 1000 MCG: 1000 INJECTION, SOLUTION INTRAMUSCULAR at 09:30

## 2020-12-20 RX ADMIN — PANTOPRAZOLE SODIUM 40 MG: 40 TABLET, DELAYED RELEASE ORAL at 05:14

## 2020-12-20 RX ADMIN — SODIUM CHLORIDE, PRESERVATIVE FREE 10 ML: 5 INJECTION INTRAVENOUS at 09:30

## 2020-12-20 RX ADMIN — HYDROCHLOROTHIAZIDE 25 MG: 25 TABLET ORAL at 09:29

## 2020-12-20 RX ADMIN — METOPROLOL SUCCINATE 100 MG: 50 TABLET, EXTENDED RELEASE ORAL at 09:24

## 2020-12-20 RX ADMIN — VALSARTAN 160 MG: 160 TABLET, FILM COATED ORAL at 09:30

## 2020-12-20 RX ADMIN — APIXABAN 10 MG: 5 TABLET, FILM COATED ORAL at 09:30

## 2020-12-20 NOTE — DISCHARGE SUMMARY
[facility] Medicine Services  DISCHARGE SUMMARY    Patient Identification:  Name:  Starla Sandhu  Age:  40 y.o.  Sex:  female  :  1980  MRN:  [id number]  Visit Number:  [id number]    Date of Admission: 2020  Date of Discharge: 2020     PCP: Radha Curtis MD    Discharging Provider: Karina Santamaria PA-C / Dr. Moose Reyes DO    Admission/Discharge Diagnoses     Discharge Diagnoses:  -Bilateral pulmonary emboli (main segmental branches and extending into the right main pulmonary artery) without cor pulmonale   -Dyspnea on exertion, improving  -Systemic lupus erythematosus   -Stage IIIa CKD   -Essential hypertension   -Borderline hyperlipidemia   -Grade I diastolic dysfunction  -GERD  -History of gastric banding  -Vitamin B12 deficiency   -Anxiety/depression  -Morbid obesity, BMI 50.92    Consults/Procedures     Consults:   · None     Procedures Performed:  · 2020: Transthoracic echocardiogram   · Left ventricular ejection fraction appears to be 61 - 65%. Left ventricular systolic function is normal.  · Left ventricular diastolic function is consistent with (grade I) impaired relaxation.  · Estimated right ventricular systolic pressure from tricuspid regurgitation is normal (<35 mmHg).    History of Presenting Illness     Starla Sandhu is a 40 y.o. female with past medical history significant for systemic lupus erythematosus, stage IIIa CKD, essential hypertension, hyperlipidemia, GERD, history of gastric banding , morbid obesity, and anxiety/depression that presented to the [facility] emergency department on 2020 for evaluation of shortness of breath.  Please see admitting history and physical for further details. Patient was found to have bilateral pulmonary emboli in the main segmental branches and extending into the right main pulmonary artery.     Hospital Course     Patient was admitted to the telemetry floor for further  evaluation and treatment. Patient never required oxygen support. Patient was placed on heparin drip initially after bolus as she had no absolute contraindications to anticoagulation. Patient was without personal history of clotting in the past. Uncertain at this time if PE was provoked. Potential provoking factors include SLE/antiphospholipid syndrome, obesity, sedentary lifestyle, young age, birth control, and possible familial clotting disorder. Her mother had recently passed away of a presumed PE, she also found later that her grandmother had a clotting disorder.     Patient was monitored on telemetry. She remained on room air. Tolerated heparin. She was transitioned to PO Eliquis, for which she tolerated well. No bleeding noted. Venous doppler of bilateral lower extremities was obtained, negative for DVT. Transthoracic echocardiogram obtained, no evidence of right heart strain. Hypercoagulable work up limited with acute thrombus, this was explained to patient. Factor V Leiden and prothrombin gene mutation sent, pending at discharge. She did develop a mild headache on day Eliquis was started, head CT obtained revealed no acute intra-cranial abnormalities. Her headache stopped with tylenol and Zofran, felt to be due to a dose of mag sulfate she was administered. Patient also noted to be vitamin B12 deficient, she was administered cyanocobalamin injections inpatient. This will be deferred to PCP for continuation. Recommend repeat testing in several weeks. Lupus anticoagulant panel, anti-cardiolipin antibody, and antibeta-2 glycoprotein 1 antibody pending at discharge, she will need to continue following up with her rheumatologist.     On day of discharge, it was felt that she had reached maximal inpatient benefit and was stable for discharge. Patient continued on Eliquis. She is to continue her home medications as previously prescribed. She will be scheduled to follow up with PCP within one week post discharge. She  will be referred to hematology, likely undergo hypercoagulable work in a few months post acute thrombus. Patient was educated on signs/symptoms warranting emergent return to care, verbalized understanding.    Discharge Vitals/Physical Examination     Vital Signs:  Temp:  [97.7 °F (36.5 °C)-98.2 °F (36.8 °C)] 97.7 °F (36.5 °C)  Heart Rate:  [74-89] 82  Resp:  [16-18] 16  BP: (101-148)/(68-81) 129/75  Mean Arterial Pressure (Non-Invasive) for the past 24 hrs (Last 3 readings):   Noninvasive MAP (mmHg)   12/20/20 0654 101   12/20/20 0237 111   12/19/20 1815 82     SpO2 Percentage    12/19/20 1815 12/20/20 0237 12/20/20 0654   SpO2: 94% 98% 96%     SpO2:  [94 %-98 %] 96 %  on   ;   Device (Oxygen Therapy): room air    Body mass index is 50.92 kg/m².  Wt Readings from Last 3 Encounters:   12/20/20 (!) 139 kg (306 lb)   10/05/20 135 kg (297 lb)   06/27/17 118 kg (260 lb 12.8 oz)       Physical Exam:  Physical Exam  Nursing note reviewed.   Constitutional:       General: She is awake. She is not in acute distress.     Appearance: She is well-developed. She is morbidly obese. She is not toxic-appearing.      Comments: Lying in bed, no acute distress noted. No family present at bedside. On room air.   HENT:      Head: Normocephalic and atraumatic.      Mouth/Throat:      Mouth: Mucous membranes are moist.   Eyes:      Conjunctiva/sclera: Conjunctivae normal.      Pupils: Pupils are equal, round, and reactive to light.   Neck:      Musculoskeletal: Normal range of motion and neck supple.   Cardiovascular:      Rate and Rhythm: Normal rate and regular rhythm.      Pulses:           Dorsalis pedis pulses are 2+ on the right side and 2+ on the left side.        Posterior tibial pulses are 2+ on the right side and 2+ on the left side.      Heart sounds: Normal heart sounds. No murmur. No friction rub. No gallop.    Pulmonary:      Effort: Pulmonary effort is normal. No respiratory distress.      Breath sounds: Normal breath  sounds and air entry. No wheezing, rhonchi or rales.      Comments: Speaks in full sentences without dyspnea, on room air  Abdominal:      General: Abdomen is protuberant. Bowel sounds are normal. There is no distension.      Palpations: Abdomen is soft.      Tenderness: There is no abdominal tenderness.   Genitourinary:     Comments: No cam catheter in place.  Musculoskeletal:      Right lower leg: No edema.      Left lower leg: No edema.   Skin:     General: Skin is warm and dry.      Capillary Refill: Capillary refill takes less than 2 seconds.      Findings: No abscess, erythema, lesion or wound.   Neurological:      General: No focal deficit present.      Mental Status: She is alert and oriented to person, place, and time.      Cranial Nerves: Cranial nerves are intact.      Sensory: Sensation is intact.      Motor: Motor function is intact.      Comments: Awake and alert. Follows commands. Answers questions appropriately. Moves all extremities equally. Strength and sensation intact. No focal neuro deficit on exam.   Psychiatric:         Attention and Perception: Attention normal.         Mood and Affect: Mood and affect normal.         Speech: Speech normal.         Behavior: Behavior normal. Behavior is cooperative.         Thought Content: Thought content normal.         Cognition and Memory: Cognition and memory normal.         Judgment: Judgment normal.       Pertinent Laboratory/Radiology Results     Pertinent Laboratory Results:  Results from last 7 days   Lab Units 12/18/20  1010 12/18/20  0431 12/17/20  1843   TROPONIN T ng/mL <0.010 <0.010 <0.010       Results from last 7 days   Lab Units 12/20/20  0323   CHOLESTEROL mg/dL 203*   TRIGLYCERIDES mg/dL 132   HDL CHOL mg/dL 56   LDL CHOL mg/dL 124*       Results from last 7 days   Lab Units 12/20/20  0323 12/19/20  0116 12/18/20  0431 12/17/20  1843   CRP mg/dL  --   --   --  3.29*   WBC 10*3/mm3 7.46 7.94 11.56* 11.06*   HEMOGLOBIN g/dL 11.9* 11.2*  12.1 11.9*   HEMATOCRIT % 37.9 36.2 40.2 38.5   MCV fL 92.0 91.6 93.9 92.1   MCHC g/dL 31.4* 30.9* 30.1* 30.9*   PLATELETS 10*3/mm3 298 282 267 279   INR   --   --  1.07 1.04     Results from last 7 days   Lab Units 12/20/20  0323 12/19/20  0116 12/18/20  0431 12/17/20  1843   SODIUM mmol/L 140 140 138 138   POTASSIUM mmol/L 4.2 4.1 3.9 4.2   MAGNESIUM mg/dL 1.7 1.8 1.8  --    CHLORIDE mmol/L 105 105 104 103   CO2 mmol/L 23.8 23.4 22.8 25.9   BUN mg/dL 8 10 12 13   CREATININE mg/dL 1.16* 1.05* 1.06* 1.18*   EGFR IF NONAFRICN AM mL/min/1.73 52* 59* 58* 51*   CALCIUM mg/dL 9.1 8.9 8.7 9.0   GLUCOSE mg/dL 95 98 78 96   ALBUMIN g/dL  --   --  3.85 3.72   BILIRUBIN mg/dL  --   --  0.3 0.2   ALK PHOS U/L  --   --  52 52   AST (SGOT) U/L  --   --  15 17   ALT (SGPT) U/L  --   --  9 9   Estimated Creatinine Clearance: 93.2 mL/min (A) (by C-G formula based on SCr of 1.16 mg/dL (H)).    Lab Results   Component Value Date    HGBA1C 5.20 12/18/2020     Lab Results   Component Value Date    TSH 2.900 12/18/2020     Microbiology Results (last 10 days)     Procedure Component Value - Date/Time    COVID-19 and FLU A/B PCR - Swab, Nasopharynx [[id number]]  (Normal) Collected: 12/18/20 0131    Lab Status: Final result Specimen: Swab from Nasopharynx Updated: 12/18/20 0201     COVID19 Not Detected     Influenza A PCR Not Detected     Influenza B PCR Not Detected    Narrative:      Fact sheet for providers: https://www.fda.gov/media/696040/download    Fact sheet for patients: https://www.fda.gov/media/204853/download    Test performed by PCR.        Pertinent Radiology Results:  Imaging Results (All)     Procedure Component Value Units Date/Time    CT Head Without Contrast [[id number]] Collected: 12/19/20 1717     Updated: 12/19/20 1720    Narrative:      FINDINGS:  No acute intracranial abnormality is demonstrated. There is no evidence of acute intracranial hemorrhage.  No visible mass, mass effect, cerebral edema, extra-axial fluid  collection or hydrocephalus. Note is made of mild cerebral volume loss that is greatest in the frontal region, out of proportion for the patient's age.    Impression:      1. No acute intracranial abnormality.  2. Mild generalized cerebral volume loss that is greatest in the frontal regions. This is out of proportion to the patient's age.    Signer Name: Ryan Ndiaye MD   Signed: 12/19/2020 5:17 PM   Workstation Name: [id number]    [facility] Venous Doppler Lower Extremity Bilateral (duplex) [[id number]] Collected: 12/18/20 1337     Updated: 12/18/20 1340    Narrative:      Multiple real-time images were obtained. The deep veins were well  demonstrated sonographically. There is good color doppler signal seen  filling the deep veins. They were completely compressed by the  ultrasound transducer. There was good spontaneous venous flow and  augmentation. There are no echoes seen along the deep veins to suggest  clot.    Impression:      No sonographic findings of DVT in the lower extremities     This report was finalized on 12/18/2020 1:38 PM by Dr. Moose Vazquez II, MD.    CT Chest Pulmonary Embolism [[id number]] Collected: 12/17/20 2347     Updated: 12/17/20 2349    Narrative:      FINDINGS:  Filling defects are seen in the bilateral pulmonary artery main segmental branches and extend into the right main pulmonary artery. There is a right-sided arch. Lungs are grossly clear. No acute osseous abnormality. Upper abdominal structures appear  within normal limits.  RV/LV ratio is 0.7    Impression:      1. There are bilateral pulmonary embolisms. RV/LV ratio is 0.7  2. Lung fields are grossly clear.    Signer Name: Zoey Sparks MD   Signed: 12/17/2020 11:47 PM   Workstation Name: [id number]    [facility]     Test Results Pending at Discharge:  Pending Labs     Order Current Status    Beta-2 Glycoprotein I Antibody,G / M In process    Factor 5 Leiden In  process    Factor II, DNA Analysis In process    Lupus Anticoag / Cardiolipin Ab In process    Lupus Anticoagulant Panel In process        Discharge Disposition/Discharge Medications/Discharge Appointments     Discharge Disposition:   Home or Self Care    Condition at Discharge:  Stable     DME Prescribed at Discharge:  None     Discharge Diet:  Diet Instructions     Diet: Regular      Discharge Diet: Regular        Discharge Activity:  Activity Instructions     Activity as Tolerated          Code Status While Inpatient:  Code Status and Medical Interventions:   Ordered at: 12/18/20 0052     Code Status:    CPR     Medical Interventions (Level of Support Prior to Arrest):    Full     Discharge Medications:     Discharge Medications      New Medications      Instructions Start Date   apixaban 5 MG tablet tablet  Commonly known as: ELIQUIS   10 mg, Oral, Every 12 Hours Scheduled      apixaban 5 MG tablet tablet  Commonly known as: ELIQUIS   5 mg, Oral, Every 12 Hours Scheduled   Start Date: December 26, 2020     cyanocobalamin 1000 MCG/ML injection   1,000 mcg, Intramuscular, Daily         Continue These Medications      Instructions Start Date   azaTHIOprine 50 MG tablet  Commonly known as: IMURAN   100 mg, Oral, Daily      gabapentin 300 MG capsule  Commonly known as: NEURONTIN   600 mg, Oral, Nightly      hydroCHLOROthiazide 25 MG tablet  Commonly known as: HYDRODIURIL   25 mg, Oral, Daily      HYDROcodone-acetaminophen 7.5-325 MG per tablet  Commonly known as: NORCO   1 tablet, Oral, 2 Times Daily PRN      metoprolol succinate  MG 24 hr tablet  Commonly known as: TOPROL-XL   100 mg, Oral, Daily      Plaquenil 200 MG tablet  Generic drug: hydroxychloroquine   200 mg, Oral, Daily      valsartan 160 MG tablet  Commonly known as: DIOVAN   160 mg, Oral, Daily           Discharge Appointments:  Additional Instructions for the Follow-ups that You Need to Schedule     Call MD With Problems / Concerns   As directed       Call PCP or return to the ED with recurrent or worsening symptoms.    Order Comments: Call PCP or return to the ED with recurrent or worsening symptoms.          Discharge Follow-up with PCP   As directed       Currently Documented PCP:    Radha Curtis MD    PCP Phone Number:    [phone]     Follow Up Details: 1 week with CBC         Discharge Follow-up with Specified Provider: Hematology; 1 Month   As directed      To: Hematology    Follow Up: 1 Month             Attestation: I personally discussed the patient's hospital course, disposition, discharge planning, and discharge medications with attending physician, Moose Claire DO, prior to time of discharge.       Karina Santamaria PA-C  Hospitalist Service -- [facility]       12/20/20  08:42 EST    Discharge Time: Greater than 30 minutes     Please send a copy of this dictation to the following providers:  Radha Curtis MD

## 2020-12-20 NOTE — DISCHARGE INSTR - APPOINTMENTS
Denise rascone  Closed  On the  Weekend  Will  Call   On  Monday  And  Get apt  With  [facility] and  Referral to  hematology

## 2020-12-21 ENCOUNTER — READMISSION MANAGEMENT (OUTPATIENT)
Dept: CALL CENTER | Facility: HOSPITAL | Age: 40
End: 2020-12-21

## 2020-12-21 LAB
F5 GENE MUT ANL BLD/T: NORMAL
FACTOR II, DNA ANALYSIS: NORMAL

## 2020-12-21 NOTE — OUTREACH NOTE
Prep Survey      Responses   [facility] patient discharged from?  [facility]   Is LACE score < 7 ?  No   Eligibility  Readm Mgmt   Discharge diagnosis  Bilateral pulmonary emboli (main segmental branches and extending into the right main pulmonary artery) without cor pulmonale    Does the patient have one of the following disease processes/diagnoses(primary or secondary)?  Other   Does the patient have Home health ordered?  No   Is there a DME ordered?  No   Prep survey completed?  Yes          Aicha Pizarro RN

## 2020-12-22 ENCOUNTER — READMISSION MANAGEMENT (OUTPATIENT)
Dept: CALL CENTER | Facility: HOSPITAL | Age: 40
End: 2020-12-22

## 2020-12-22 LAB
B2 GLYCOPROT1 IGG SER-ACNC: <9 GPI IGG UNITS (ref 0–20)
B2 GLYCOPROT1 IGM SER-ACNC: <9 GPI IGM UNITS (ref 0–32)

## 2020-12-22 NOTE — OUTREACH NOTE
Medical Week 1 Survey      Responses   [facility] patient discharged from?  [facility]   Does the patient have one of the following disease processes/diagnoses(primary or secondary)?  Other   Week 1 attempt successful?  No   Unsuccessful attempts  Attempt 1          Patricia Louis RN

## 2020-12-23 ENCOUNTER — READMISSION MANAGEMENT (OUTPATIENT)
Dept: CALL CENTER | Facility: HOSPITAL | Age: 40
End: 2020-12-23

## 2020-12-23 NOTE — OUTREACH NOTE
Medical Week 1 Survey      Responses   [facility] patient discharged from?  [facility]   Does the patient have one of the following disease processes/diagnoses(primary or secondary)?  Other   Week 1 attempt successful?  Yes   Call start time  0921   Call end time  0924   Discharge diagnosis  Bilateral pulmonary emboli (main segmental branches and extending into the right main pulmonary artery) without cor pulmonale    Is patient permission given to speak with other caregiver?  Yes   List who call center can speak with  Krzysztof spouse   Meds reviewed with patient/caregiver?  Yes   Is the patient having any side effects they believe may be caused by any medication additions or changes?  No   Does the patient have all medications ordered at discharge?  Yes   Is the patient taking all medications as directed (includes completed medication regime)?  Yes   Medication comments  Meds to bed arranged her medications   Does the patient have a primary care provider?   Yes   Does the patient have an appointment with their PCP within 7 days of discharge?  Yes   Has the patient kept scheduled appointments due by today?  N/A   Comments  12/28 w/ PCP   Has home health visited the patient within 72 hours of discharge?  N/A   Psychosocial issues?  No   Did the patient receive a copy of their discharge instructions?  Yes   Nursing interventions  Reviewed instructions with patient   What is the patient's perception of their health status since discharge?  Improving   Is the patient/caregiver able to teach back signs and symptoms related to disease process for when to call PCP?  Yes   Is the patient/caregiver able to teach back signs and symptoms related to disease process for when to call 911?  Yes   Is the patient/caregiver able to teach back the hierarchy of who to call/visit for symptoms/problems? PCP, Specialist, Home health nurse, Urgent Care, ED, 911  Yes   If the patient is a current smoker, are they able to teach back resources  for cessation?  Not a smoker   Week 1 call completed?  Yes   Wrap up additional comments  No questions at this time. Son and spouse good support at home.           Lana Ruiz RN

## 2020-12-29 ENCOUNTER — READMISSION MANAGEMENT (OUTPATIENT)
Dept: CALL CENTER | Facility: HOSPITAL | Age: 40
End: 2020-12-29

## 2021-01-05 ENCOUNTER — READMISSION MANAGEMENT (OUTPATIENT)
Dept: CALL CENTER | Facility: HOSPITAL | Age: 41
End: 2021-01-05

## 2021-01-05 LAB
APTT HEX PL PPP: 2 SEC
APTT HEX PL PPP: 6 SEC
APTT IMM NP PPP: ABNORMAL S
APTT IMM NP PPP: ABNORMAL S
APTT PPP 1:1 SALINE: ABNORMAL S
APTT PPP 1:1 SALINE: ABNORMAL S
APTT PPP: 28.6 SEC
APTT PPP: 30.2 SEC
B2 GLYCOPROT1 IGA SER-ACNC: <10 SAU
B2 GLYCOPROT1 IGA SER-ACNC: <10 SAU
B2 GLYCOPROT1 IGG SER-ACNC: <10 SGU
B2 GLYCOPROT1 IGG SER-ACNC: <10 SGU
B2 GLYCOPROT1 IGM SER-ACNC: <10 SMU
B2 GLYCOPROT1 IGM SER-ACNC: <10 SMU
CARDIOLIPIN IGG SER IA-ACNC: <10 GPL
CARDIOLIPIN IGG SER IA-ACNC: <10 GPL
CARDIOLIPIN IGM SER IA-ACNC: 10 MPL
CARDIOLIPIN IGM SER IA-ACNC: 11 MPL
CONFIRM APTT: 13 SEC
CONFIRM DRVVT: 33 SEC
CONFIRM DRVVT: 33.4 SEC
DRVVT SCREEN TO CONFIRM RATIO: 1.3 RATIO
DRVVT SCREEN TO CONFIRM RATIO: 1.4 RATIO
INR PPP: 1 RATIO
INR PPP: 1.1 RATIO
LABORATORY COMMENT REPORT: ABNORMAL
LABORATORY COMMENT REPORT: ABNORMAL
PROTHROMBIN TIME: 10.8 SEC
PROTHROMBIN TIME: 11.1 SEC
SCREEN DRVVT: 47.6 SEC
SCREEN DRVVT: 51.5 SEC
THROMBIN TIME: 27.8 SEC
THROMBIN TIME: 30.3 SEC

## 2021-01-05 NOTE — OUTREACH NOTE
Medical Week 3 Survey      Responses   [facility] [place] patient discharged from?  [facility]   Does the patient have one of the following disease processes/diagnoses(primary or secondary)?  Other   Week 3 attempt successful?  Yes   Call start time  0958   Call end time  1000   Meds reviewed with patient/caregiver?  Yes   Is the patient taking all medications as directed (includes completed medication regime)?  Yes   Has the patient kept scheduled appointments due by today?  Yes   Comments  Has seen PCP   Comments  still has little sob nothig severe, much improved   What is the patient's perception of their health status since discharge?  Improving   Week 3 Call Completed?  Yes   Graduated  Yes   Is the patient interested in additional calls from an ambulatory ?  NOTE:  applies to high risk patients requiring additional follow-up.  No   Did the patient feel the follow up calls were helpful during their recovery period?  Yes   Was the number of calls appropriate?  Yes   Graduated/Revoked comments  She states is doing well, no new issues can call us if needed          Lidya Martinez, RN

## 2021-02-14 ENCOUNTER — HOSPITAL ENCOUNTER (EMERGENCY)
Facility: HOSPITAL | Age: 41
Discharge: HOME | End: 2021-02-14
Attending: FAMILY MEDICINE | Admitting: FAMILY MEDICINE

## 2021-02-14 ENCOUNTER — APPOINTMENT (OUTPATIENT)
Dept: GENERAL RADIOLOGY | Facility: HOSPITAL | Age: 41
End: 2021-02-14

## 2021-02-14 VITALS
HEIGHT: 63 IN | OXYGEN SATURATION: 99 % | WEIGHT: 293 LBS | RESPIRATION RATE: 22 BRPM | HEART RATE: 74 BPM | TEMPERATURE: 98.7 F | DIASTOLIC BLOOD PRESSURE: 72 MMHG | BODY MASS INDEX: 51.91 KG/M2 | SYSTOLIC BLOOD PRESSURE: 142 MMHG

## 2021-02-14 DIAGNOSIS — J18.9 PNEUMONIA OF RIGHT MIDDLE LOBE DUE TO INFECTIOUS ORGANISM: ICD-10-CM

## 2021-02-14 DIAGNOSIS — U07.1 COVID-19: Primary | ICD-10-CM

## 2021-02-14 LAB
ALBUMIN SERPL-MCNC: 3.93 G/DL (ref 3.5–5.2)
ALBUMIN/GLOB SERPL: 1.1 G/DL
ALP SERPL-CCNC: 92 U/L (ref 39–117)
ALT SERPL W P-5'-P-CCNC: 110 U/L (ref 1–33)
ANION GAP SERPL CALCULATED.3IONS-SCNC: 14.1 MMOL/L (ref 5–15)
AST SERPL-CCNC: 141 U/L (ref 1–32)
BASOPHILS # BLD AUTO: 0.01 10*3/MM3 (ref 0–0.2)
BASOPHILS NFR BLD AUTO: 0.2 % (ref 0–1.5)
BILIRUB SERPL-MCNC: 0.3 MG/DL (ref 0–1.2)
BUN SERPL-MCNC: 9 MG/DL (ref 6–20)
BUN/CREAT SERPL: 10 (ref 7–25)
CALCIUM SPEC-SCNC: 8.9 MG/DL (ref 8.6–10.5)
CHLORIDE SERPL-SCNC: 106 MMOL/L (ref 98–107)
CO2 SERPL-SCNC: 24.9 MMOL/L (ref 22–29)
CREAT SERPL-MCNC: 0.9 MG/DL (ref 0.57–1)
D DIMER PPP FEU-MCNC: 0.39 MCGFEU/ML (ref 0–0.5)
DEPRECATED RDW RBC AUTO: 42.9 FL (ref 37–54)
EOSINOPHIL # BLD AUTO: 0 10*3/MM3 (ref 0–0.4)
EOSINOPHIL NFR BLD AUTO: 0 % (ref 0.3–6.2)
ERYTHROCYTE [DISTWIDTH] IN BLOOD BY AUTOMATED COUNT: 13.1 % (ref 12.3–15.4)
FERRITIN SERPL-MCNC: 159.6 NG/ML (ref 13–150)
FLUAV RNA RESP QL NAA+PROBE: NOT DETECTED
FLUBV RNA RESP QL NAA+PROBE: NOT DETECTED
GFR SERPL CREATININE-BSD FRML MDRD: 70 ML/MIN/1.73
GLOBULIN UR ELPH-MCNC: 3.5 GM/DL
GLUCOSE SERPL-MCNC: 115 MG/DL (ref 65–99)
HCT VFR BLD AUTO: 43.4 % (ref 34–46.6)
HGB BLD-MCNC: 13.4 G/DL (ref 12–15.9)
IMM GRANULOCYTES # BLD AUTO: 0.01 10*3/MM3 (ref 0–0.05)
IMM GRANULOCYTES NFR BLD AUTO: 0.2 % (ref 0–0.5)
LDH SERPL-CCNC: 365 U/L (ref 135–214)
LYMPHOCYTES # BLD AUTO: 0.69 10*3/MM3 (ref 0.7–3.1)
LYMPHOCYTES NFR BLD AUTO: 16.7 % (ref 19.6–45.3)
MCH RBC QN AUTO: 27.5 PG (ref 26.6–33)
MCHC RBC AUTO-ENTMCNC: 30.9 G/DL (ref 31.5–35.7)
MCV RBC AUTO: 89.1 FL (ref 79–97)
MONOCYTES # BLD AUTO: 0.45 10*3/MM3 (ref 0.1–0.9)
MONOCYTES NFR BLD AUTO: 10.9 % (ref 5–12)
NEUTROPHILS NFR BLD AUTO: 2.97 10*3/MM3 (ref 1.7–7)
NEUTROPHILS NFR BLD AUTO: 72 % (ref 42.7–76)
NRBC BLD AUTO-RTO: 0 /100 WBC (ref 0–0.2)
PLATELET # BLD AUTO: 184 10*3/MM3 (ref 140–450)
PMV BLD AUTO: 10 FL (ref 6–12)
POTASSIUM SERPL-SCNC: 3.5 MMOL/L (ref 3.5–5.2)
PROT SERPL-MCNC: 7.4 G/DL (ref 6–8.5)
RBC # BLD AUTO: 4.87 10*6/MM3 (ref 3.77–5.28)
SARS-COV-2 RNA RESP QL NAA+PROBE: DETECTED
SODIUM SERPL-SCNC: 145 MMOL/L (ref 136–145)
WBC # BLD AUTO: 4.13 10*3/MM3 (ref 3.4–10.8)

## 2021-02-14 PROCEDURE — 93005 ELECTROCARDIOGRAM TRACING: CPT | Performed by: FAMILY MEDICINE

## 2021-02-14 PROCEDURE — 96365 THER/PROPH/DIAG IV INF INIT: CPT

## 2021-02-14 PROCEDURE — 25010000002 PROCHLORPERAZINE 10 MG/2ML SOLUTION: Performed by: FAMILY MEDICINE

## 2021-02-14 PROCEDURE — 71045 X-RAY EXAM CHEST 1 VIEW: CPT

## 2021-02-14 PROCEDURE — 87636 SARSCOV2 & INF A&B AMP PRB: CPT | Performed by: FAMILY MEDICINE

## 2021-02-14 PROCEDURE — 99284 EMERGENCY DEPT VISIT MOD MDM: CPT

## 2021-02-14 PROCEDURE — 85025 COMPLETE CBC W/AUTO DIFF WBC: CPT | Performed by: FAMILY MEDICINE

## 2021-02-14 PROCEDURE — 85379 FIBRIN DEGRADATION QUANT: CPT | Performed by: FAMILY MEDICINE

## 2021-02-14 PROCEDURE — 96375 TX/PRO/DX INJ NEW DRUG ADDON: CPT

## 2021-02-14 PROCEDURE — 93010 ELECTROCARDIOGRAM REPORT: CPT | Performed by: INTERNAL MEDICINE

## 2021-02-14 PROCEDURE — 83615 LACTATE (LD) (LDH) ENZYME: CPT | Performed by: FAMILY MEDICINE

## 2021-02-14 PROCEDURE — 25010000002 DEXAMETHASONE PER 1 MG: Performed by: FAMILY MEDICINE

## 2021-02-14 PROCEDURE — 80053 COMPREHEN METABOLIC PANEL: CPT | Performed by: FAMILY MEDICINE

## 2021-02-14 PROCEDURE — 82728 ASSAY OF FERRITIN: CPT | Performed by: FAMILY MEDICINE

## 2021-02-14 RX ORDER — METHYLPREDNISOLONE 4 MG/1
TABLET ORAL
Qty: 21 EACH | Refills: 0 | Status: SHIPPED | OUTPATIENT
Start: 2021-02-14 | End: 2022-06-21

## 2021-02-14 RX ORDER — PROMETHAZINE HYDROCHLORIDE 25 MG/1
25 TABLET ORAL EVERY 6 HOURS PRN
Qty: 30 TABLET | Refills: 0 | Status: SHIPPED | OUTPATIENT
Start: 2021-02-14 | End: 2022-06-21

## 2021-02-14 RX ORDER — ONDANSETRON 4 MG/1
4 TABLET, ORALLY DISINTEGRATING ORAL EVERY 8 HOURS PRN
Qty: 30 TABLET | Refills: 0 | Status: SHIPPED | OUTPATIENT
Start: 2021-02-14 | End: 2022-06-21

## 2021-02-14 RX ORDER — SODIUM CHLORIDE 0.9 % (FLUSH) 0.9 %
10 SYRINGE (ML) INJECTION AS NEEDED
Status: DISCONTINUED | OUTPATIENT
Start: 2021-02-14 | End: 2021-02-14 | Stop reason: HOSPADM

## 2021-02-14 RX ORDER — DOXYCYCLINE 100 MG/1
100 CAPSULE ORAL 2 TIMES DAILY
Qty: 20 CAPSULE | Refills: 0 | Status: SHIPPED | OUTPATIENT
Start: 2021-02-14 | End: 2022-06-21 | Stop reason: ALTCHOICE

## 2021-02-14 RX ORDER — PROCHLORPERAZINE EDISYLATE 5 MG/ML
5 INJECTION INTRAMUSCULAR; INTRAVENOUS ONCE
Status: COMPLETED | OUTPATIENT
Start: 2021-02-14 | End: 2021-02-14

## 2021-02-14 RX ORDER — DEXAMETHASONE SODIUM PHOSPHATE 10 MG/ML
10 INJECTION INTRAMUSCULAR; INTRAVENOUS ONCE
Status: COMPLETED | OUTPATIENT
Start: 2021-02-14 | End: 2021-02-14

## 2021-02-14 RX ADMIN — SODIUM CHLORIDE 1000 ML: 9 INJECTION, SOLUTION INTRAVENOUS at 09:52

## 2021-02-14 RX ADMIN — DEXAMETHASONE SODIUM PHOSPHATE 10 MG: 10 INJECTION INTRAMUSCULAR; INTRAVENOUS at 11:00

## 2021-02-14 RX ADMIN — AZTREONAM 2 G: 2 INJECTION, POWDER, FOR SOLUTION INTRAMUSCULAR; INTRAVENOUS at 10:43

## 2021-02-14 RX ADMIN — PROCHLORPERAZINE EDISYLATE 5 MG: 5 INJECTION INTRAMUSCULAR; INTRAVENOUS at 09:52

## 2021-02-14 NOTE — ED PROVIDER NOTES
Subjective   Patient is a 40 year old female who presents to the emergency department complaining of nausea and vomiting since Friday.  The patient states that she has not been able to keep anything down since Friday.  She states that she has not had any fever or chills but worries that she has coronavirus.  She states that she is having some diarrhea as well.  She denies any shortness of breath but mentions that she recently was diagnosed with a pulmonary embolism and is still on Eliquis.  The patient denies any additional symptoms at this time.          Review of Systems   Constitutional: Positive for appetite change, chills and fatigue. Negative for activity change, diaphoresis and fever.   HENT: Negative for congestion, postnasal drip, rhinorrhea, sinus pressure, sinus pain, sneezing and sore throat.    Eyes: Negative for pain, discharge, redness and itching.   Respiratory: Negative for apnea, cough, choking, chest tightness and wheezing.    Cardiovascular: Negative for chest pain, palpitations and leg swelling.   Gastrointestinal: Positive for diarrhea, nausea and vomiting. Negative for abdominal distention, abdominal pain and constipation.   Genitourinary: Negative for difficulty urinating and enuresis.   Musculoskeletal: Negative for arthralgias and back pain.   Neurological: Negative for dizziness and headaches.   Psychiatric/Behavioral: Negative for agitation and confusion.       Past Medical History:   Diagnosis Date   • Anxiety    • BMI 50.0-59.9, adult (CMS/HCC)    • Chronic back pain    • Degenerative disc disease, lumbar    • Depression    • GERD (gastroesophageal reflux disease)    • Hypercholesterolemia    • Hypertension     3 meds   • Lupus (CMS/HCC)     on azathioprine and plaquenil.  Extreme fatigue, arthalgias, rash   • Malignant melanoma of skin (CMS/HCC)     left shoulder, left abdomen.s/p wide local excision.  She sees a dermatologist in [place] for biyearly exams.   • Osteoarthritis         Allergies   Allergen Reactions   • Rocephin [Ceftriaxone] Swelling and Rash     SWELLING OF LIPS       Past Surgical History:   Procedure Laterality Date   • COLONOSCOPY  2019   • ENDOSCOPY  2016   • EXPLORATORY LAPAROTOMY  1994    8 pound cyst removal on ovary at age 16.     • LAPAROSCOPIC CHOLECYSTECTOMY  2016   • LAPAROSCOPIC GASTRIC BANDING  12/17/2014    DR. CHUCK PEÑA   • TONSILLECTOMY  1985       Family History   Problem Relation Age of Onset   • Hypertension Mother    • Skin cancer Mother    • Pulmonary embolism Mother    • Heart attack Father    • Diabetes Father    • Hypertension Father    • Clotting disorder Maternal Grandmother    • Lung cancer Maternal Grandfather    • Breast cancer Paternal Grandmother    • Heart attack Paternal Grandfather        Social History     Socioeconomic History   • Marital status:      Spouse name: Not on file   • Number of children: Not on file   • Years of education: Not on file   • Highest education level: Not on file   Tobacco Use   • Smoking status: Never Smoker   • Smokeless tobacco: Never Used   Substance and Sexual Activity   • Alcohol use: No   • Drug use: No   Social History Narrative    Pt lives in [place], [place] w/  Krzysztof. They have 1 child 15yo. 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            Objective   Physical Exam  Vitals signs and nursing note reviewed.   Constitutional:       General: She is not in acute distress.     Appearance: Normal appearance. She is obese. She is not ill-appearing, toxic-appearing or diaphoretic.   HENT:      Head: Normocephalic.      Right Ear: External ear normal. There is no impacted cerumen.      Left Ear: External ear normal. There is no impacted cerumen.      Nose: Nose normal. No congestion or rhinorrhea.      Mouth/Throat:      Mouth: Mucous membranes are moist.      Pharynx: No oropharyngeal exudate or posterior oropharyngeal erythema.   Eyes:      General: No scleral  icterus.        Right eye: No discharge.         Left eye: No discharge.      Pupils: Pupils are equal, round, and reactive to light.   Neck:      Musculoskeletal: Normal range of motion and neck supple. No neck rigidity or muscular tenderness.      Vascular: No carotid bruit.   Cardiovascular:      Rate and Rhythm: Regular rhythm.      Pulses: Normal pulses.      Heart sounds: Normal heart sounds. No murmur. No friction rub. No gallop.    Pulmonary:      Effort: Pulmonary effort is normal. No respiratory distress.      Breath sounds: Normal breath sounds. No stridor. No wheezing, rhonchi or rales.   Chest:      Chest wall: No tenderness.   Abdominal:      General: Abdomen is flat. There is no distension.      Palpations: There is no mass.      Tenderness: There is no abdominal tenderness. There is no guarding or rebound.      Hernia: No hernia is present.   Musculoskeletal: Normal range of motion.         General: No swelling, tenderness, deformity or signs of injury.   Lymphadenopathy:      Cervical: No cervical adenopathy.   Skin:     General: Skin is warm and dry.      Capillary Refill: Capillary refill takes less than 2 seconds.      Coloration: Skin is not jaundiced or pale.      Findings: No bruising or erythema.   Neurological:      General: No focal deficit present.      Mental Status: She is alert and oriented to person, place, and time.   Psychiatric:         Mood and Affect: Mood normal.         Behavior: Behavior normal.         Procedures           ED Course                                           MDM  Number of Diagnoses or Management Options  COVID-19: new and requires workup  Pneumonia of right middle lobe due to infectious organism: new and requires workup     Amount and/or Complexity of Data Reviewed  Clinical lab tests: ordered and reviewed  Tests in the radiology section of CPT®: ordered and reviewed  Tests in the medicine section of CPT®: ordered and reviewed  Independent visualization of  images, tracings, or specimens: yes    Risk of Complications, Morbidity, and/or Mortality  Presenting problems: moderate  Diagnostic procedures: moderate  Management options: moderate    Patient Progress  Patient progress: stable      Final diagnoses:   COVID-19   Pneumonia of right middle lobe due to infectious organism            Leanne Faye DO  02/14/21 1057       Leanne Faye DO  02/14/21 1158

## 2021-02-15 ENCOUNTER — EPISODE CHANGES (OUTPATIENT)
Dept: CASE MANAGEMENT | Facility: OTHER | Age: 41
End: 2021-02-15

## 2021-02-17 LAB
QT INTERVAL: 410 MS
QTC INTERVAL: 463 MS

## 2021-03-16 ENCOUNTER — BULK ORDERING (OUTPATIENT)
Dept: CASE MANAGEMENT | Facility: OTHER | Age: 41
End: 2021-03-16

## 2021-03-16 DIAGNOSIS — Z23 IMMUNIZATION DUE: ICD-10-CM

## 2022-06-21 ENCOUNTER — OFFICE VISIT (OUTPATIENT)
Dept: CARDIOLOGY | Facility: CLINIC | Age: 42
End: 2022-06-21

## 2022-06-21 VITALS
WEIGHT: 293 LBS | DIASTOLIC BLOOD PRESSURE: 88 MMHG | BODY MASS INDEX: 51.91 KG/M2 | SYSTOLIC BLOOD PRESSURE: 122 MMHG | HEART RATE: 102 BPM | HEIGHT: 63 IN | TEMPERATURE: 98.6 F | OXYGEN SATURATION: 100 %

## 2022-06-21 DIAGNOSIS — E66.01 MORBID OBESITY: Chronic | ICD-10-CM

## 2022-06-21 DIAGNOSIS — I10 ESSENTIAL HYPERTENSION: Chronic | ICD-10-CM

## 2022-06-21 DIAGNOSIS — R06.00 DYSPNEA, UNSPECIFIED TYPE: Primary | ICD-10-CM

## 2022-06-21 DIAGNOSIS — R60.0 PEDAL EDEMA: ICD-10-CM

## 2022-06-21 PROCEDURE — 93000 ELECTROCARDIOGRAM COMPLETE: CPT | Performed by: NURSE PRACTITIONER

## 2022-06-21 PROCEDURE — 99204 OFFICE O/P NEW MOD 45 MIN: CPT | Performed by: NURSE PRACTITIONER

## 2022-06-21 NOTE — PROGRESS NOTES
Subjective   Starla Sandhu is a 42 y.o. female.   Chief Complaint   Patient presents with   • Establish Care   • Shortness of Breath     With exertion    • Leg Swelling     Michael LE       History of Present Illness   Starla Sandhu is a 42 year old female who presents to the clinic today as a new patient for cardiac evaluation.     She was referred by PCP for further evaluation of dyspnea and pedal edema. She reports dyspnea over the last 4 months, worse with activity. Denies orthopnea. Worsening bilateral LE swelling with L>R. She takes HCTZ for BP which doesn't improve swelling. She does consume sodium. Non smoker. Denies palpitations or chest pains.  Had been evaluated by cardiology in the past for palpitations but she denies any current problems.     HTN on Metoprolol XL, Diovan and HCTZ. Intermittent home monitoring with readings around 130/60.     The following portions of the patient's history were reviewed and updated as appropriate: allergies, current medications, past family history, past medical history, past social history, past surgical history and problem list.    Current Outpatient Medications:   •  apixaban (ELIQUIS) 5 MG tablet tablet, Take 1 tablet by mouth Every 12 (Twelve) Hours. Indications: DVT/PE (active thrombosis). Start after other Eliquis Rx completed, 1/19/21, Disp: 60 tablet, Rfl: 0  •  azaTHIOprine (IMURAN) 50 MG tablet, Take 100 mg by mouth Daily., Disp: , Rfl:   •  gabapentin (NEURONTIN) 300 MG capsule, Take 300 mg by mouth 2 (Two) Times a Day., Disp: , Rfl:   •  hydroCHLOROthiazide (HYDRODIURIL) 25 MG tablet, Take 25 mg by mouth Daily., Disp: , Rfl:   •  HYDROcodone-acetaminophen (NORCO) 7.5-325 MG per tablet, Take 1 tablet by mouth Daily As Needed for Moderate Pain ., Disp: , Rfl:   •  hydroxychloroquine (PLAQUENIL) 200 MG tablet, Take 200 mg by mouth Daily., Disp: , Rfl:   •  metoprolol succinate XL (TOPROL-XL) 100 MG 24 hr tablet, Take 100 mg by mouth Daily., Disp: , Rfl:    •  valsartan (DIOVAN) 160 MG tablet, Take 160 mg by mouth Daily., Disp: , Rfl:     Review of Systems   Constitutional: Negative for activity change, appetite change, chills, diaphoresis, fatigue and fever.   HENT: Negative for congestion, drooling, ear discharge, ear pain, mouth sores, nosebleeds, postnasal drip, rhinorrhea, sinus pressure, sneezing and sore throat.    Eyes: Negative for pain, discharge and visual disturbance.   Respiratory: Positive for shortness of breath. Negative for cough, chest tightness and wheezing.    Cardiovascular: Negative for chest pain, palpitations and leg swelling.   Gastrointestinal: Negative for abdominal pain, constipation, diarrhea, nausea and vomiting.   Endocrine: Negative for cold intolerance, heat intolerance, polydipsia, polyphagia and polyuria.   Musculoskeletal: Negative for arthralgias, myalgias and neck pain.   Skin: Negative for rash and wound.   Neurological: Negative for syncope, speech difficulty, weakness, light-headedness and headaches.   Hematological: Negative for adenopathy. Does not bruise/bleed easily.   Psychiatric/Behavioral: Negative for confusion, dysphoric mood and sleep disturbance. The patient is not nervous/anxious.    All other systems reviewed and are negative.    Patient Active Problem List   Diagnosis   • Morbid obesity (HCC)   • Essential hypertension   • Pulmonary emboli (HCC)   • Lupus (systemic lupus erythematosus) (HCC)   • Hypercholesterolemia   • GERD (gastroesophageal reflux disease)   • Depression   • Anxiety disorder   • Osteoarthritis     Past Medical History:   Diagnosis Date   • Anxiety    • BMI 50.0-59.9, adult (HCC)    • Chronic back pain    • Degenerative disc disease, lumbar    • Depression    • GERD (gastroesophageal reflux disease)    • Hypercholesterolemia    • Hypertension     3 meds   • Lupus (HCC)     on azathioprine and plaquenil.  Extreme fatigue, arthalgias, rash   • Malignant melanoma of skin (HCC)     left shoulder,  "left abdomen.s/p wide local excision.  She sees a dermatologist in [place] for biyearly exams.     Past Surgical History:   Procedure Laterality Date   • COLONOSCOPY  2019   • ENDOSCOPY  2016   • EXPLORATORY LAPAROTOMY  1994    8 pound cyst removal on ovary at age 16.     • LAPAROSCOPIC CHOLECYSTECTOMY  2016   • LAPAROSCOPIC GASTRIC BANDING  2014    DR. CHUCK PEÑA   • TONSILLECTOMY     .  /88 (BP Location: Left arm, Patient Position: Sitting, Cuff Size: Adult)   Pulse 102   Temp 98.6 °F (37 °C)   Ht 160 cm (63\")   Wt (!) 139 kg (307 lb)   SpO2 100%   BMI 54.38 kg/m²     Objective   Allergies   Allergen Reactions   • Rocephin [Ceftriaxone] Swelling and Rash     SWELLING OF LIPS     Physical Exam  Vitals reviewed.   Constitutional:       Appearance: Normal appearance. She is well-developed. She is obese.   HENT:      Head: Normocephalic.   Eyes:      Conjunctiva/sclera: Conjunctivae normal.   Neck:      Thyroid: No thyromegaly.      Vascular: No carotid bruit or JVD.   Cardiovascular:      Rate and Rhythm: Normal rate and regular rhythm.   Pulmonary:      Effort: Pulmonary effort is normal.      Breath sounds: Normal breath sounds.   Abdominal:      General: Bowel sounds are normal.      Palpations: Abdomen is soft. There is no hepatomegaly, splenomegaly or mass.      Tenderness: There is no abdominal tenderness.   Musculoskeletal:      Cervical back: Normal range of motion and neck supple.      Right lower le+ Edema present.      Left lower le+ Edema present.   Skin:     General: Skin is warm and dry.   Neurological:      Mental Status: She is alert and oriented to person, place, and time.   Psychiatric:         Attention and Perception: Attention normal.         Mood and Affect: Mood normal.         Speech: Speech normal.         Behavior: Behavior normal. Behavior is cooperative.         Cognition and Memory: Cognition normal.           ECG 12 Lead    Date/Time: 2022 1:30 " PM  Performed by: Deana Gracia APRN  Authorized by: Deana Gracia APRN   Comparison: compared with previous ECG from 2021  Similar to previous ECG  Rhythm: sinus tachycardia  Rate: tachycardic  BPM: 102    Clinical impression: non-specific ECG  Comments: QT/QTc - 378/437             Starla Sandhu  Echo Complete w/Doppler and Color Flow  Order# [id number]  Reading physician: Mindy Sanchez MD Ordering physician: Martha Painter DO Study date: 20       Patient Information    Patient Name   Starla Sandhu MRN   [id number] Legal Sex   Female  (Age)   1980 (42 y.o.)         PACS Images     Show images for Adult Transthoracic Echo Complete W/ Cont if Necessary Per Protocol   Sedation Narrator Report    Sedation Narrator Report        Interpretation Summary    · Left ventricular ejection fraction appears to be 61 - 65%. Left ventricular systolic function is normal.  · Left ventricular diastolic function is consistent with (grade I) impaired relaxation.  · Estimated right ventricular systolic pressure from tricuspid regurgitation is normal (<35 mmHg).             Assessment & Plan   Diagnoses and all orders for this visit:    1. Dyspnea, unspecified type (Primary)  -     Adult Transthoracic Echo Complete W/ Cont if Necessary Per Protocol; Future  -     BNP; Future  -     Basic Metabolic Panel; Future  -     ECG 12 Lead  EKG, reviewed and discussed, no acute changes.  Further testing will be arranged    2. Pedal edema  -     US venous doppler lower extremity bilateral (duplex); Future  Further testing will be arranged   lab with results pending  Could consider diuretics to help with symptoms    3. Essential hypertension  Controlled, continue current medications.  Recommend routine monitoring.  Counseling in low-sodium dietary intake    4. Morbid obesity (HCC)  Lifestyle modifications recommended to lower cardiovascular disease risk including heart healthy diet, regular exercise,  maintenance of desirable body weight and avoidance of tobacco products.      Cholesterol panel reviewed from PCP on 3/2022.  Current 10-year ASCVD risk with current laboratory data and history of hypertension, non-smoker and non- diabetes is a 0.8% risk.  Her lifetime ASCVD risk is 39%.  We will continue to monitor in addition to lifefestyle modifications.    Heart rate minimally elevated, she is asked to keep a log and return at next visit.     Review of medical record  Further testing with results pending  Follow-up in 4 weeks, sooner if needed.

## 2022-06-24 ENCOUNTER — PATIENT ROUNDING (BHMG ONLY) (OUTPATIENT)
Dept: CARDIOLOGY | Facility: CLINIC | Age: 42
End: 2022-06-24

## 2022-06-24 NOTE — PROGRESS NOTES
June 24, 2022    Hello, may I speak with Starla Sandhu?    My name is Alton    I am  with [facility]  [facility]  [address] [place] [address] [phone].    Before we get started may I verify your date of birth? 1980    I am calling to officially welcome you to our practice and ask about your recent visit. Is this a good time to talk? NO: Left Voicemail

## 2022-07-06 DIAGNOSIS — R06.00 DYSPNEA, UNSPECIFIED TYPE: ICD-10-CM

## 2023-02-16 ENCOUNTER — HOSPITAL ENCOUNTER (OUTPATIENT)
Dept: MAMMOGRAPHY | Facility: HOSPITAL | Age: 43
Discharge: HOME | End: 2023-02-16
Admitting: NURSE PRACTITIONER
Payer: COMMERCIAL

## 2023-02-16 DIAGNOSIS — Z12.31 VISIT FOR SCREENING MAMMOGRAM: ICD-10-CM

## 2023-02-16 PROCEDURE — 77067 SCR MAMMO BI INCL CAD: CPT | Performed by: RADIOLOGY

## 2023-02-16 PROCEDURE — 77063 BREAST TOMOSYNTHESIS BI: CPT | Performed by: RADIOLOGY

## 2023-02-16 PROCEDURE — 77067 SCR MAMMO BI INCL CAD: CPT

## 2023-02-16 PROCEDURE — 77063 BREAST TOMOSYNTHESIS BI: CPT

## 2023-03-07 ENCOUNTER — HOSPITAL ENCOUNTER (OUTPATIENT)
Dept: MAMMOGRAPHY | Facility: HOSPITAL | Age: 43
Discharge: HOME | End: 2023-03-07
Payer: COMMERCIAL

## 2023-03-07 ENCOUNTER — HOSPITAL ENCOUNTER (OUTPATIENT)
Dept: ULTRASOUND IMAGING | Facility: HOSPITAL | Age: 43
Discharge: HOME | End: 2023-03-07
Payer: COMMERCIAL

## 2023-03-07 DIAGNOSIS — R92.8 ABNORMAL MAMMOGRAM: ICD-10-CM

## 2023-03-07 PROCEDURE — 77065 DX MAMMO INCL CAD UNI: CPT

## 2023-03-07 PROCEDURE — 77065 DX MAMMO INCL CAD UNI: CPT | Performed by: RADIOLOGY

## 2023-03-07 PROCEDURE — G0279 TOMOSYNTHESIS, MAMMO: HCPCS

## 2023-03-07 PROCEDURE — 77061 BREAST TOMOSYNTHESIS UNI: CPT | Performed by: RADIOLOGY

## 2024-02-22 ENCOUNTER — APPOINTMENT (OUTPATIENT)
Dept: GENERAL RADIOLOGY | Facility: HOSPITAL | Age: 44
End: 2024-02-22
Payer: MEDICAID

## 2024-02-22 LAB
ALBUMIN SERPL-MCNC: 4.2 G/DL (ref 3.5–5.2)
ALBUMIN/GLOB SERPL: 1.4 G/DL
ALP SERPL-CCNC: 72 U/L (ref 39–117)
ALT SERPL W P-5'-P-CCNC: 15 U/L (ref 1–33)
ANION GAP SERPL CALCULATED.3IONS-SCNC: 12.9 MMOL/L (ref 5–15)
AST SERPL-CCNC: 24 U/L (ref 1–32)
BASOPHILS # BLD AUTO: 0.1 10*3/MM3 (ref 0–0.2)
BASOPHILS NFR BLD AUTO: 1.1 % (ref 0–1.5)
BILIRUB SERPL-MCNC: 0.2 MG/DL (ref 0–1.2)
BUN SERPL-MCNC: 20 MG/DL (ref 6–20)
BUN/CREAT SERPL: 13 (ref 7–25)
CALCIUM SPEC-SCNC: 9.4 MG/DL (ref 8.6–10.5)
CHLORIDE SERPL-SCNC: 100 MMOL/L (ref 98–107)
CO2 SERPL-SCNC: 28.1 MMOL/L (ref 22–29)
CREAT SERPL-MCNC: 1.54 MG/DL (ref 0.57–1)
DEPRECATED RDW RBC AUTO: 43.8 FL (ref 37–54)
EGFRCR SERPLBLD CKD-EPI 2021: 43.3 ML/MIN/1.73
EOSINOPHIL # BLD AUTO: 0.28 10*3/MM3 (ref 0–0.4)
EOSINOPHIL NFR BLD AUTO: 3.1 % (ref 0.3–6.2)
ERYTHROCYTE [DISTWIDTH] IN BLOOD BY AUTOMATED COUNT: 12.7 % (ref 12.3–15.4)
GLOBULIN UR ELPH-MCNC: 3.1 GM/DL
GLUCOSE SERPL-MCNC: 95 MG/DL (ref 65–99)
HCT VFR BLD AUTO: 40.9 % (ref 34–46.6)
HGB BLD-MCNC: 13.2 G/DL (ref 12–15.9)
HOLD SPECIMEN: NORMAL
HOLD SPECIMEN: NORMAL
IMM GRANULOCYTES # BLD AUTO: 0.04 10*3/MM3 (ref 0–0.05)
IMM GRANULOCYTES NFR BLD AUTO: 0.4 % (ref 0–0.5)
LYMPHOCYTES # BLD AUTO: 3.11 10*3/MM3 (ref 0.7–3.1)
LYMPHOCYTES NFR BLD AUTO: 34.8 % (ref 19.6–45.3)
MCH RBC QN AUTO: 30.3 PG (ref 26.6–33)
MCHC RBC AUTO-ENTMCNC: 32.3 G/DL (ref 31.5–35.7)
MCV RBC AUTO: 93.8 FL (ref 79–97)
MONOCYTES # BLD AUTO: 0.75 10*3/MM3 (ref 0.1–0.9)
MONOCYTES NFR BLD AUTO: 8.4 % (ref 5–12)
NEUTROPHILS NFR BLD AUTO: 4.65 10*3/MM3 (ref 1.7–7)
NEUTROPHILS NFR BLD AUTO: 52.2 % (ref 42.7–76)
NRBC BLD AUTO-RTO: 0 /100 WBC (ref 0–0.2)
NT-PROBNP SERPL-MCNC: <36 PG/ML (ref 0–450)
PLATELET # BLD AUTO: 293 10*3/MM3 (ref 140–450)
PMV BLD AUTO: 9.9 FL (ref 6–12)
POTASSIUM SERPL-SCNC: 3.5 MMOL/L (ref 3.5–5.2)
PROT SERPL-MCNC: 7.3 G/DL (ref 6–8.5)
RBC # BLD AUTO: 4.36 10*6/MM3 (ref 3.77–5.28)
SODIUM SERPL-SCNC: 141 MMOL/L (ref 136–145)
TROPONIN T SERPL HS-MCNC: 6 NG/L
WBC NRBC COR # BLD AUTO: 8.93 10*3/MM3 (ref 3.4–10.8)
WHOLE BLOOD HOLD COAG: NORMAL
WHOLE BLOOD HOLD SPECIMEN: NORMAL

## 2024-02-22 PROCEDURE — 71045 X-RAY EXAM CHEST 1 VIEW: CPT | Performed by: RADIOLOGY

## 2024-02-22 PROCEDURE — 84484 ASSAY OF TROPONIN QUANT: CPT | Performed by: EMERGENCY MEDICINE

## 2024-02-22 PROCEDURE — 93005 ELECTROCARDIOGRAM TRACING: CPT | Performed by: EMERGENCY MEDICINE

## 2024-02-22 PROCEDURE — 99285 EMERGENCY DEPT VISIT HI MDM: CPT

## 2024-02-22 PROCEDURE — 93005 ELECTROCARDIOGRAM TRACING: CPT | Performed by: STUDENT IN AN ORGANIZED HEALTH CARE EDUCATION/TRAINING PROGRAM

## 2024-02-22 PROCEDURE — 85025 COMPLETE CBC W/AUTO DIFF WBC: CPT | Performed by: EMERGENCY MEDICINE

## 2024-02-22 PROCEDURE — 36415 COLL VENOUS BLD VENIPUNCTURE: CPT

## 2024-02-22 PROCEDURE — 71045 X-RAY EXAM CHEST 1 VIEW: CPT

## 2024-02-22 PROCEDURE — 80053 COMPREHEN METABOLIC PANEL: CPT | Performed by: EMERGENCY MEDICINE

## 2024-02-22 PROCEDURE — 83880 ASSAY OF NATRIURETIC PEPTIDE: CPT | Performed by: EMERGENCY MEDICINE

## 2024-02-22 PROCEDURE — 93010 ELECTROCARDIOGRAM REPORT: CPT | Performed by: INTERNAL MEDICINE

## 2024-02-22 RX ORDER — SODIUM CHLORIDE 0.9 % (FLUSH) 0.9 %
10 SYRINGE (ML) INJECTION AS NEEDED
Status: DISCONTINUED | OUTPATIENT
Start: 2024-02-22 | End: 2024-02-23 | Stop reason: HOSPADM

## 2024-02-23 ENCOUNTER — HOSPITAL ENCOUNTER (EMERGENCY)
Facility: HOSPITAL | Age: 44
Discharge: HOME | End: 2024-02-23
Attending: EMERGENCY MEDICINE
Payer: MEDICAID

## 2024-02-23 ENCOUNTER — APPOINTMENT (OUTPATIENT)
Dept: CT IMAGING | Facility: HOSPITAL | Age: 44
End: 2024-02-23
Payer: MEDICAID

## 2024-02-23 VITALS
RESPIRATION RATE: 17 BRPM | HEART RATE: 88 BPM | TEMPERATURE: 98.2 F | DIASTOLIC BLOOD PRESSURE: 85 MMHG | OXYGEN SATURATION: 100 % | SYSTOLIC BLOOD PRESSURE: 135 MMHG | HEIGHT: 63 IN | WEIGHT: 255 LBS | BODY MASS INDEX: 45.18 KG/M2

## 2024-02-23 DIAGNOSIS — R06.02 SHORTNESS OF BREATH: Primary | ICD-10-CM

## 2024-02-23 LAB
APTT PPP: 28.9 SECONDS (ref 26.5–34.5)
D DIMER PPP FEU-MCNC: <0.27 MCGFEU/ML (ref 0–0.5)
INR PPP: 1 (ref 0.9–1.1)
PROTHROMBIN TIME: 13.7 SECONDS (ref 12.1–14.7)

## 2024-02-23 PROCEDURE — 25510000001 IOPAMIDOL PER 1 ML: Performed by: EMERGENCY MEDICINE

## 2024-02-23 PROCEDURE — 25810000003 SODIUM CHLORIDE 0.9 % SOLUTION: Performed by: PHYSICIAN ASSISTANT

## 2024-02-23 PROCEDURE — 71275 CT ANGIOGRAPHY CHEST: CPT | Performed by: RADIOLOGY

## 2024-02-23 PROCEDURE — 85730 THROMBOPLASTIN TIME PARTIAL: CPT | Performed by: PHYSICIAN ASSISTANT

## 2024-02-23 PROCEDURE — 71275 CT ANGIOGRAPHY CHEST: CPT

## 2024-02-23 PROCEDURE — 85610 PROTHROMBIN TIME: CPT | Performed by: PHYSICIAN ASSISTANT

## 2024-02-23 PROCEDURE — 85379 FIBRIN DEGRADATION QUANT: CPT | Performed by: PHYSICIAN ASSISTANT

## 2024-02-23 RX ADMIN — IOPAMIDOL 80 ML: 755 INJECTION, SOLUTION INTRAVENOUS at 01:13

## 2024-02-23 RX ADMIN — SODIUM CHLORIDE 1000 ML: 9 INJECTION, SOLUTION INTRAVENOUS at 01:19

## 2024-02-23 NOTE — ED PROVIDER NOTES
"Subjective   History of Present Illness  43-year-old female who presents to the ED today for shortness of breath.  She reports that she has been short of breath for about a month.  She describes it as being \"more winded than normal.\"  She states about 3 years ago she had an episode of shortness of breath and was diagnosed with a pulmonary embolus.  She states her mother passed away from a pulmonary embolus.  She saw her primary care provider today and a D-dimer was performed and it was elevated therefore she was sent to the ER for further evaluation.  The patient states she is currently on Eliquis due to her history of blood clots and has been taking as prescribed.  The patient also has lupus.  She denies any chest pain.  She denies any cough or fever.    History provided by:  Patient  Shortness of Breath  Severity:  Moderate  Onset quality:  Gradual  Duration:  1 month  Timing:  Constant  Progression:  Unchanged  Chronicity:  New  Relieved by:  Nothing  Worsened by:  Activity and exertion  Associated symptoms: no abdominal pain, no chest pain, no cough, no diaphoresis, no ear pain, no fever, no headaches, no hemoptysis, no neck pain, no rash, no sore throat, no sputum production, no syncope, no swollen glands, no vomiting and no wheezing    Risk factors: hx of PE/DVT and obesity    Risk factors: no tobacco use        Review of Systems   Constitutional: Negative.  Negative for diaphoresis and fever.   HENT: Negative.  Negative for ear pain and sore throat.    Eyes: Negative.    Respiratory:  Positive for shortness of breath. Negative for cough, hemoptysis, sputum production and wheezing.    Cardiovascular:  Negative for chest pain and syncope.   Gastrointestinal:  Negative for abdominal pain and vomiting.   Genitourinary: Negative.    Musculoskeletal:  Negative for neck pain.   Skin:  Negative for rash.   Neurological:  Negative for headaches.   Psychiatric/Behavioral: Negative.     All other systems reviewed and " are negative.      Past Medical History:   Diagnosis Date    Anxiety     BMI 50.0-59.9, adult     Chronic back pain     Degenerative disc disease, lumbar     Depression     GERD (gastroesophageal reflux disease)     Hypercholesterolemia     Hypertension     3 meds    Lupus     on azathioprine and plaquenil.  Extreme fatigue, arthalgias, rash    Malignant melanoma of skin     left shoulder, left abdomen.s/p wide local excision.  She sees a dermatologist in [place] for biyearly exams.       Allergies   Allergen Reactions    Rocephin [Ceftriaxone] Swelling and Rash     SWELLING OF LIPS       Past Surgical History:   Procedure Laterality Date    COLONOSCOPY  2019    ENDOSCOPY  2016    EXPLORATORY LAPAROTOMY  1994    8 pound cyst removal on ovary at age 16.      LAPAROSCOPIC CHOLECYSTECTOMY  2016    LAPAROSCOPIC GASTRIC BANDING  12/17/2014    DR. CHUCK PEÑA    TONSILLECTOMY  1985       Family History   Problem Relation Age of Onset    Hyperlipidemia Mother     Hypertension Mother     Skin cancer Mother     Pulmonary embolism Mother     Hyperlipidemia Father     Heart attack Father     Diabetes Father     Hypertension Father     Clotting disorder Maternal Grandmother     Lung cancer Maternal Grandfather     Breast cancer Paternal Grandmother     Heart attack Paternal Grandfather        Social History     Socioeconomic History    Marital status:    Tobacco Use    Smoking status: Never    Smokeless tobacco: Never   Vaping Use    Vaping Use: Never used   Substance and Sexual Activity    Alcohol use: No    Drug use: No           Objective   Physical Exam  Vitals and nursing note reviewed.   Constitutional:       General: She is not in acute distress.     Appearance: She is well-developed. She is obese. She is not diaphoretic.   HENT:      Head: Normocephalic and atraumatic.   Eyes:      Extraocular Movements: Extraocular movements intact.      Pupils: Pupils are equal, round, and reactive to light.   Neck:       Vascular: No JVD.      Trachea: No tracheal deviation.   Cardiovascular:      Rate and Rhythm: Normal rate and regular rhythm.      Pulses: Normal pulses.      Heart sounds: Normal heart sounds.   Pulmonary:      Effort: Pulmonary effort is normal.      Breath sounds: Normal breath sounds.   Chest:      Chest wall: No tenderness.   Abdominal:      General: Bowel sounds are normal.      Palpations: Abdomen is soft.      Tenderness: There is no abdominal tenderness.   Musculoskeletal:         General: Normal range of motion.      Cervical back: Normal range of motion and neck supple.      Right lower leg: No edema.      Left lower leg: No edema.   Lymphadenopathy:      Cervical: No cervical adenopathy.   Skin:     General: Skin is warm and dry.      Capillary Refill: Capillary refill takes less than 2 seconds.   Neurological:      General: No focal deficit present.      Mental Status: She is alert and oriented to person, place, and time.   Psychiatric:         Mood and Affect: Mood normal.         Procedures       Results for orders placed or performed during the hospital encounter of 02/23/24   Comprehensive Metabolic Panel    Specimen: Arm, Right; Blood   Result Value Ref Range    Glucose 95 65 - 99 mg/dL    BUN 20 6 - 20 mg/dL    Creatinine 1.54 (H) 0.57 - 1.00 mg/dL    Sodium 141 136 - 145 mmol/L    Potassium 3.5 3.5 - 5.2 mmol/L    Chloride 100 98 - 107 mmol/L    CO2 28.1 22.0 - 29.0 mmol/L    Calcium 9.4 8.6 - 10.5 mg/dL    Total Protein 7.3 6.0 - 8.5 g/dL    Albumin 4.2 3.5 - 5.2 g/dL    ALT (SGPT) 15 1 - 33 U/L    AST (SGOT) 24 1 - 32 U/L    Alkaline Phosphatase 72 39 - 117 U/L    Total Bilirubin 0.2 0.0 - 1.2 mg/dL    Globulin 3.1 gm/dL    A/G Ratio 1.4 g/dL    BUN/Creatinine Ratio 13.0 7.0 - 25.0    Anion Gap 12.9 5.0 - 15.0 mmol/L    eGFR 43.3 (L) >60.0 mL/min/1.73   BNP    Specimen: Arm, Right; Blood   Result Value Ref Range    proBNP <36.0 0.0 - 450.0 pg/mL   Single High Sensitivity Troponin T     Specimen: Arm, Right; Blood   Result Value Ref Range    HS Troponin T 6 <14 ng/L   CBC Auto Differential    Specimen: Arm, Right; Blood   Result Value Ref Range    WBC 8.93 3.40 - 10.80 10*3/mm3    RBC 4.36 3.77 - 5.28 10*6/mm3    Hemoglobin 13.2 12.0 - 15.9 g/dL    Hematocrit 40.9 34.0 - 46.6 %    MCV 93.8 79.0 - 97.0 fL    MCH 30.3 26.6 - 33.0 pg    MCHC 32.3 31.5 - 35.7 g/dL    RDW 12.7 12.3 - 15.4 %    RDW-SD 43.8 37.0 - 54.0 fl    MPV 9.9 6.0 - 12.0 fL    Platelets 293 140 - 450 10*3/mm3    Neutrophil % 52.2 42.7 - 76.0 %    Lymphocyte % 34.8 19.6 - 45.3 %    Monocyte % 8.4 5.0 - 12.0 %    Eosinophil % 3.1 0.3 - 6.2 %    Basophil % 1.1 0.0 - 1.5 %    Immature Grans % 0.4 0.0 - 0.5 %    Neutrophils, Absolute 4.65 1.70 - 7.00 10*3/mm3    Lymphocytes, Absolute 3.11 (H) 0.70 - 3.10 10*3/mm3    Monocytes, Absolute 0.75 0.10 - 0.90 10*3/mm3    Eosinophils, Absolute 0.28 0.00 - 0.40 10*3/mm3    Basophils, Absolute 0.10 0.00 - 0.20 10*3/mm3    Immature Grans, Absolute 0.04 0.00 - 0.05 10*3/mm3    nRBC 0.0 0.0 - 0.2 /100 WBC   Protime-INR    Specimen: Arm, Right; Blood   Result Value Ref Range    Protime 13.7 12.1 - 14.7 Seconds    INR 1.00 0.90 - 1.10   aPTT    Specimen: Arm, Right; Blood   Result Value Ref Range    PTT 28.9 26.5 - 34.5 seconds   D-dimer, Quantitative    Specimen: Arm, Right; Blood   Result Value Ref Range    D-Dimer, Quantitative <0.27 0.00 - 0.50 MCGFEU/mL   ECG 12 Lead ED Triage Standing Order; SOA   Result Value Ref Range    QT Interval 362 ms    QTC Interval 454 ms   Green Top (Gel)   Result Value Ref Range    Extra Tube Hold for add-ons.    Lavender Top   Result Value Ref Range    Extra Tube hold for add-on    Gold Top - SST   Result Value Ref Range    Extra Tube Hold for add-ons.    Light Blue Top   Result Value Ref Range    Extra Tube Hold for add-ons.           ED Course  ED Course as of 02/23/24 0313   u Feb 22, 2024 2055 EKG performed at 1940 and interpreted at 1940 shows sinus rhythm  rate of 95 Axis within normal limits no acute ischemic changes  Electronically signed by Ann Marie Brothers MD, 02/22/24, 8:55 PM EST.   [PL]   Fri Feb 23, 2024 0259 CT Angiogram Chest Pulmonary Embolism  Heart size at the upper limits of normal.  Small size hiatal hernia with lap band device in place.  Cholecystectomy clips noted in the right upper quadrant.  No thoracic aortic aneurysm or dissection.  Right aortic arch.  No acute process seen in the thyroid gland.  No pulmonary embolus. No lobar consolidation or edema.  No bronchial inflammation.  No free fluid or free air in the upper abdomen.     IMPRESSION:     1.  No thoracic aortic aneurysm or dissection.  2.  No pulmonary embolus.  3.  Right-sided aortic arch.  4.  Heart size at the upper limits of normal with no pericardial  effusion.  5.  Lap band device in place.  6.  Cholecystectomy.  7.  No lobar consolidation, edema, pleural effusion, or pneumothorax.  8.  No adenopathy or bronchial inflammation.   [AH]      ED Course User Index  [AH] Jeanie Moore, PA  [PL] Ann Marie Brothers MD                                             Medical Decision Making  43-year-old female who presents to the ED today for shortness of breath.  This has been going on for a month.  She saw her primary care provider today who performed a D-dimer and it was elevated.  The patient does have a past history of PE and is currently on Eliquis.  D-dimer this evening in the ED is negative.  CT chest for PE protocol showed no acute abnormalities.  The patient has been hemodynamically stable while in the ED.  She will be able to be discharged home to follow-up as an outpatient.  She was advised to return to the ED at any time if symptoms change or worsen.    Problems Addressed:  Shortness of breath: complicated acute illness or injury    Amount and/or Complexity of Data Reviewed  Labs: ordered.  Radiology: ordered. Decision-making details documented in ED Course.  ECG/medicine tests:  ordered.    Risk  Prescription drug management.        Final diagnoses:   Shortness of breath       ED Disposition  ED Disposition       ED Disposition   Discharge    Condition   Stable    Comment   --               Jovita Li, NP  [address] [place], [place] [id number] [phone]    Call in 2 days           Medication List      No changes were made to your prescriptions during this visit.            Jeanie Moore, PA  02/23/24 0313

## 2024-02-23 NOTE — DISCHARGE INSTRUCTIONS
Follow-up with your primary care provider in the office at the next available appointment.  Return to the ED at any time if symptoms change or worsen.

## 2024-02-25 LAB
QT INTERVAL: 362 MS
QTC INTERVAL: 454 MS

## 2024-11-29 ENCOUNTER — HOSPITAL ENCOUNTER (EMERGENCY)
Facility: HOSPITAL | Age: 44
Discharge: HOME | End: 2024-11-29
Attending: STUDENT IN AN ORGANIZED HEALTH CARE EDUCATION/TRAINING PROGRAM
Payer: COMMERCIAL

## 2024-11-29 VITALS
WEIGHT: 219 LBS | RESPIRATION RATE: 18 BRPM | BODY MASS INDEX: 38.8 KG/M2 | OXYGEN SATURATION: 95 % | HEART RATE: 77 BPM | HEIGHT: 63 IN | TEMPERATURE: 98.7 F | SYSTOLIC BLOOD PRESSURE: 142 MMHG | DIASTOLIC BLOOD PRESSURE: 92 MMHG

## 2024-11-29 DIAGNOSIS — S61.412A LACERATION OF LEFT PALM, INITIAL ENCOUNTER: Primary | ICD-10-CM

## 2024-11-29 PROCEDURE — 25010000002 LIDOCAINE 1% - EPINEPHRINE 1:100000 1 %-1:100000 SOLUTION: Performed by: STUDENT IN AN ORGANIZED HEALTH CARE EDUCATION/TRAINING PROGRAM

## 2024-11-29 PROCEDURE — 99282 EMERGENCY DEPT VISIT SF MDM: CPT

## 2024-11-29 PROCEDURE — 25010000002 LIDOCAINE PF 1% 1 % SOLUTION: Performed by: STUDENT IN AN ORGANIZED HEALTH CARE EDUCATION/TRAINING PROGRAM

## 2024-11-29 RX ORDER — LIDOCAINE HYDROCHLORIDE AND EPINEPHRINE 10; 10 MG/ML; UG/ML
5 INJECTION, SOLUTION INFILTRATION; PERINEURAL ONCE
Status: COMPLETED | OUTPATIENT
Start: 2024-11-29 | End: 2024-11-29

## 2024-11-29 RX ORDER — MUPIROCIN 20 MG/G
1 OINTMENT TOPICAL 2 TIMES DAILY
Qty: 10 G | Refills: 0 | Status: SHIPPED | OUTPATIENT
Start: 2024-11-29 | End: 2024-12-04

## 2024-11-29 RX ORDER — LIDOCAINE HYDROCHLORIDE AND EPINEPHRINE 10; 10 MG/ML; UG/ML
5 INJECTION, SOLUTION INFILTRATION; PERINEURAL ONCE
Status: DISCONTINUED | OUTPATIENT
Start: 2024-11-29 | End: 2024-11-29

## 2024-11-29 RX ORDER — AMOXICILLIN 875 MG/1
875 TABLET, COATED ORAL 2 TIMES DAILY
Qty: 14 TABLET | Refills: 0 | Status: SHIPPED | OUTPATIENT
Start: 2024-11-29 | End: 2024-12-06

## 2024-11-29 RX ORDER — LIDOCAINE HYDROCHLORIDE 10 MG/ML
30 INJECTION, SOLUTION EPIDURAL; INFILTRATION; INTRACAUDAL; PERINEURAL ONCE
Status: COMPLETED | OUTPATIENT
Start: 2024-11-29 | End: 2024-11-29

## 2024-11-29 RX ADMIN — LIDOCAINE HYDROCHLORIDE,EPINEPHRINE BITARTRATE 5 ML: 10; .01 INJECTION, SOLUTION INFILTRATION; PERINEURAL at 22:18

## 2024-11-29 RX ADMIN — LIDOCAINE HYDROCHLORIDE 30 ML: 10 INJECTION, SOLUTION EPIDURAL; INFILTRATION; INTRACAUDAL; PERINEURAL at 21:21

## 2024-11-30 NOTE — DISCHARGE INSTRUCTIONS
Antibiotics have been sent to the pharmacy to take twice daily for a total of 7 days.  Mupirocin ointment can also be utilized topically once or twice a day.  Keep the wound clean and dry.  Sutures need to be removed at roughly 7 days.

## 2024-11-30 NOTE — ED PROVIDER NOTES
Subjective   History of Present Illness  Healthy-appearing 44-year-old female presents for acute accidental laceration to the palmar surface of the left hand at the base of the index finger.  Laceration was caused by peeling potatoes in the kitchen.  Laceration was from a clean knife unwrapped from cellophane.  Patient does states that she is chronically anticoagulated on Eliquis 5 mg twice daily for history of spontaneous pulmonary emboli.      Review of Systems    Past Medical History:   Diagnosis Date    Anxiety     BMI 50.0-59.9, adult     Chronic back pain     Degenerative disc disease, lumbar     Depression     GERD (gastroesophageal reflux disease)     Hypercholesterolemia     Hypertension     3 meds    Lupus     on azathioprine and plaquenil.  Extreme fatigue, arthalgias, rash    Malignant melanoma of skin     left shoulder, left abdomen.s/p wide local excision.  She sees a dermatologist in [place] for biyearly exams.       Allergies   Allergen Reactions    Rocephin [Ceftriaxone] Swelling and Rash     SWELLING OF LIPS       Past Surgical History:   Procedure Laterality Date    COLONOSCOPY  2019    ENDOSCOPY  2016    EXPLORATORY LAPAROTOMY  1994    8 pound cyst removal on ovary at age 16.      LAPAROSCOPIC CHOLECYSTECTOMY  2016    LAPAROSCOPIC GASTRIC BANDING  12/17/2014    DR. CHUCK PEÑA    TONSILLECTOMY  1985       Family History   Problem Relation Age of Onset    Hyperlipidemia Mother     Hypertension Mother     Skin cancer Mother     Pulmonary embolism Mother     Hyperlipidemia Father     Heart attack Father     Diabetes Father     Hypertension Father     Clotting disorder Maternal Grandmother     Lung cancer Maternal Grandfather     Breast cancer Paternal Grandmother     Heart attack Paternal Grandfather        Social History     Socioeconomic History    Marital status:    Tobacco Use    Smoking status: Never    Smokeless tobacco: Never   Vaping Use    Vaping status: Never Used   Substance and  Sexual Activity    Alcohol use: No    Drug use: No           Objective   Physical Exam  Vitals and nursing note reviewed.   Constitutional:       General: She is not in acute distress.     Appearance: She is well-developed. She is obese. She is not diaphoretic.   HENT:      Head: Normocephalic and atraumatic.      Right Ear: External ear normal.      Left Ear: External ear normal.      Nose: Nose normal.   Eyes:      Conjunctiva/sclera: Conjunctivae normal.      Pupils: Pupils are equal, round, and reactive to light.   Neck:      Vascular: No JVD.      Trachea: No tracheal deviation.   Cardiovascular:      Rate and Rhythm: Normal rate and regular rhythm.      Heart sounds: Normal heart sounds. No murmur heard.  Pulmonary:      Effort: Pulmonary effort is normal. No respiratory distress.      Breath sounds: Normal breath sounds. No wheezing.   Abdominal:      General: Bowel sounds are normal.      Palpations: Abdomen is soft.      Tenderness: There is no abdominal tenderness.   Musculoskeletal:         General: No deformity. Normal range of motion.      Cervical back: Normal range of motion and neck supple.   Skin:     General: Skin is warm and dry.      Coloration: Skin is not pale.      Findings: No erythema or rash.      Comments: 2 cm linear curved laceration to the palmar surface of the left hand at the base of the index finger.  Subcutaneous tissue exposed with preserved motor function of the digit and normal perfusion and sensation.   Neurological:      General: No focal deficit present.      Mental Status: She is alert and oriented to person, place, and time.      Cranial Nerves: No cranial nerve deficit.   Psychiatric:         Behavior: Behavior normal.         Thought Content: Thought content normal.         Laceration Repair    Date/Time: 11/29/2024 10:00 PM    Performed by: Ivy Warner DO  Authorized by: Ivy Warner DO    Consent:     Consent obtained:  Verbal    Consent given by:  Patient    Risks,  benefits, and alternatives were discussed: yes      Risks discussed:  Infection, pain, poor wound healing, poor cosmetic result, need for additional repair, nerve damage and vascular damage  Universal protocol:     Procedure explained and questions answered to patient or proxy's satisfaction: yes      Relevant documents present and verified: yes      Site/side marked: yes      Immediately prior to procedure, a time out was called: yes      Patient identity confirmed:  Verbally with patient, hospital-assigned identification number and arm band  Anesthesia:     Anesthesia method:  Local infiltration    Local anesthetic:  Lidocaine 1% WITH epi and lidocaine 1% w/o epi (1 mL lidocaine with epi; 2 mL of lidocaine without epi.)  Laceration details:     Length (cm):  2    Depth (mm):  5  Pre-procedure details:     Preparation:  Patient was prepped and draped in usual sterile fashion and imaging obtained to evaluate for foreign bodies  Exploration:     Hemostasis achieved with:  Epinephrine and direct pressure    Wound exploration: wound explored through full range of motion and entire depth of wound visualized      Contaminated: no    Treatment:     Area cleansed with:  Soap and water and chlorhexidine    Amount of cleaning:  Extensive    Irrigation solution:  Sterile saline    Irrigation volume:  10ml    Irrigation method:  Syringe    Visualized foreign bodies/material removed: no    Repair type:     Repair type:  Simple  Post-procedure details:     Dressing:  Non-adherent dressing, sterile dressing and adhesive bandage    Procedure completion:  Tolerated well, no immediate complications             ED Course  ED Course as of 11/29/24 2244 Fri Nov 29, 2024 2044 Review of [facility] shows the patient is chronically prescribed Norco 7.5 and gabapentin.    -Laceration was repaired see procedure note.  Empiric antibiotics were sent to the pharmacy and patient was given rotations to combine Motrin 800 mg every 6-8 hours as  needed for additional pain control.  -Sutures to be removed at roughly 7 days and she will follow-up with her PCP. [LK]   2238 Patient is allergic to Rocephin with a history of anaphylaxis.  Patient required total of 13 sutures using 4 oh.  Patient is on Eliquis recommending continuing dose tonight there is no evidence of active bleeding after the laceration repair.  -Has declined Tdap as she had utilized a brand-new knife out of cellophane.  Antibiotics have been sent to the pharmacy empirically to cover for 7 days follow-up with PCP to remove sutures within 7 days.     [LK]      ED Course User Index  [LK] Ivy Warner DO KASPER reviewed by Ivy Warner DO       Medical Decision Making      Final diagnoses:   Laceration of left palm, initial encounter       ED Disposition  ED Disposition       ED Disposition   Discharge    Condition   Stable    Comment   --               Jovita Li, APRN  [address] [facility] [address] [phone]               Medication List        New Prescriptions      amoxicillin 875 MG tablet  Commonly known as: AMOXIL  Take 1 tablet by mouth 2 (Two) Times a Day for 7 days.     mupirocin 2 % ointment  Commonly known as: BACTROBAN  Apply 1 Application topically to the appropriate area as directed 2 (Two) Times a Day for 5 days.               Where to Get Your Medications        These medications were sent to [facility] - [place], [place] - [address] - [phone]  - [phone] FX  [address], [address] [address]      Phone: [phone]   amoxicillin 875 MG tablet  mupirocin 2 % ointment            Ivy Warner DO  11/29/24 2272

## 2024-12-10 NOTE — ED TRIAGE NOTES
MEDICAL SCREENING:    Reason for Visit: Hand Injury    Patient initially seen in triage.  The patient was advised further evaluation and diagnostic testing will be needed, some of the treatment and testing will be initiated in the lobby in order to begin the process.  The patient will be returned to the waiting area for the time being and possibly be re-assessed by a subsequent ED provider.  The patient will be brought back to the treatment area in as timely manner as possible.    Yes that is fine  Sharona Guy Bruce DO

## 2025-04-11 ENCOUNTER — TRANSCRIBE ORDERS (OUTPATIENT)
Dept: ADMINISTRATIVE | Facility: HOSPITAL | Age: 45
End: 2025-04-11
Payer: COMMERCIAL

## 2025-04-11 DIAGNOSIS — Z12.31 SCREENING MAMMOGRAM FOR BREAST CANCER: Primary | ICD-10-CM
